# Patient Record
Sex: MALE | Race: BLACK OR AFRICAN AMERICAN | NOT HISPANIC OR LATINO | Employment: FULL TIME | URBAN - METROPOLITAN AREA
[De-identification: names, ages, dates, MRNs, and addresses within clinical notes are randomized per-mention and may not be internally consistent; named-entity substitution may affect disease eponyms.]

---

## 2020-11-18 ENCOUNTER — OFFICE VISIT (OUTPATIENT)
Dept: ENDOCRINOLOGY | Facility: CLINIC | Age: 60
End: 2020-11-18

## 2020-11-18 VITALS
DIASTOLIC BLOOD PRESSURE: 82 MMHG | HEIGHT: 66 IN | BODY MASS INDEX: 50.62 KG/M2 | SYSTOLIC BLOOD PRESSURE: 124 MMHG | RESPIRATION RATE: 16 BRPM | TEMPERATURE: 97.5 F | HEART RATE: 79 BPM | WEIGHT: 315 LBS | OXYGEN SATURATION: 98 %

## 2020-11-18 DIAGNOSIS — Z79.4 TYPE 2 DIABETES MELLITUS WITH HYPERGLYCEMIA, WITH LONG-TERM CURRENT USE OF INSULIN (HCC): Primary | ICD-10-CM

## 2020-11-18 DIAGNOSIS — I10 HYPERTENSION, UNSPECIFIED TYPE: ICD-10-CM

## 2020-11-18 DIAGNOSIS — E66.01 CLASS 3 SEVERE OBESITY DUE TO EXCESS CALORIES WITH SERIOUS COMORBIDITY AND BODY MASS INDEX (BMI) OF 60.0 TO 69.9 IN ADULT (HCC): ICD-10-CM

## 2020-11-18 DIAGNOSIS — E78.00 PURE HYPERCHOLESTEROLEMIA: ICD-10-CM

## 2020-11-18 DIAGNOSIS — E11.65 TYPE 2 DIABETES MELLITUS WITH HYPERGLYCEMIA, WITH LONG-TERM CURRENT USE OF INSULIN (HCC): Primary | ICD-10-CM

## 2020-11-18 PROBLEM — E66.813 CLASS 3 SEVERE OBESITY DUE TO EXCESS CALORIES WITH SERIOUS COMORBIDITY AND BODY MASS INDEX (BMI) OF 60.0 TO 69.9 IN ADULT: Status: ACTIVE | Noted: 2020-11-18

## 2020-11-18 PROBLEM — N18.9 CKD (CHRONIC KIDNEY DISEASE): Status: ACTIVE | Noted: 2020-11-18

## 2020-11-18 LAB — HBA1C MFR BLD: 8.4 %

## 2020-11-18 PROCEDURE — 83036 HEMOGLOBIN GLYCOSYLATED A1C: CPT | Performed by: PHYSICIAN ASSISTANT

## 2020-11-18 PROCEDURE — 99214 OFFICE O/P EST MOD 30 MIN: CPT | Performed by: PHYSICIAN ASSISTANT

## 2020-11-18 RX ORDER — INSULIN GLARGINE 100 [IU]/ML
55 INJECTION, SOLUTION SUBCUTANEOUS NIGHTLY
COMMUNITY
End: 2021-10-18 | Stop reason: SDUPTHER

## 2020-11-18 RX ORDER — PEN NEEDLE, DIABETIC 32GX 5/32"
NEEDLE, DISPOSABLE MISCELLANEOUS
COMMUNITY
Start: 2020-11-05 | End: 2021-10-18 | Stop reason: SDUPTHER

## 2020-11-18 RX ORDER — BLOOD SUGAR DIAGNOSTIC
STRIP MISCELLANEOUS
COMMUNITY
Start: 2020-09-25 | End: 2021-06-08 | Stop reason: SDUPTHER

## 2020-11-18 RX ORDER — METOLAZONE 2.5 MG/1
1 TABLET ORAL DAILY
COMMUNITY

## 2020-11-18 NOTE — PROGRESS NOTES
"     Office Note      Date: 2020  Patient Name: Joseph Moore  MRN: [id number]  : 1960    Chief Complaint   Patient presents with   • Diabetes       History of Present Illness:   Joseph Moore is a 59 y.o. male who presents today for type 2 diabetes.   He remains on basal/bolus insulin and Trulicity.  Tolerating well.  He is testing blood sugars 4 times per day.  He reports fasting readings are typically <140.  Readings later in the day are variable, but often mid 100s.  Rarely has readings over 200.  He denies any hypoglycemia.  He denies any problems with his feet.  He remains on statin.  Eye exam due.  Kidneys - followed by nephrology.  Lipids up-to-date.    Subjective      Review of Systems:   Review of Systems   Constitutional: Negative for appetite change, chills, fatigue, fever and unexpected weight change.   Respiratory: Negative for cough, shortness of breath and wheezing.    Cardiovascular: Negative for chest pain, palpitations and leg swelling.   Gastrointestinal: Negative for abdominal pain, constipation, diarrhea, nausea and vomiting.   Endocrine: Negative for cold intolerance, heat intolerance, polydipsia, polyphagia and polyuria.   Neurological: Negative for tremors, syncope, weakness, numbness and headaches.       The following portions of the patient's history were reviewed and updated as appropriate: allergies, current medications, past family history, past medical history, past social history, past surgical history and problem list.    Objective     Vitals:    20 0915   BP: 124/82   Pulse: 79   Resp: 16   Temp: 97.5 °F (36.4 °C)   TempSrc: Infrared   SpO2: 98%   Weight: (!) 177 kg (389 lb 6.4 oz)   Height: 166.4 cm (65.5\")     Body mass index is 63.81 kg/m².    Physical Exam  Vitals signs reviewed.   Constitutional:       General: He is not in acute distress.     Appearance: Normal appearance.   Cardiovascular:      Pulses:           Dorsalis pedis pulses are 2+ on the right " side and 2+ on the left side.        Posterior tibial pulses are 2+ on the right side and 2+ on the left side.   Feet:      Right foot:      Protective Sensation: 10 sites tested. 10 sites sensed.      Skin integrity: Dry skin present.      Left foot:      Protective Sensation: 10 sites tested. 10 sites sensed.      Skin integrity: Dry skin present.      Comments:   Pes planus  Neurological:      Mental Status: He is alert and oriented to person, place, and time.   Psychiatric:         Mood and Affect: Mood and affect normal.         HEMOGLOBIN A1C  Lab Results   Component Value Date    HGBA1C 8.4 11/18/2020         Current Outpatient Medications   Medication Instructions   • Accu-Chek Guide test strip No dose, route, or frequency recorded.   • allopurinol (ZYLOPRIM) 300 mg, Oral, Daily   • BD Pen Needle Mariam 2nd Gen 32G X 4 MM misc U UTD QID   • carvedilol (COREG) 12.5 mg, Oral, 2 Times Daily With Meals   • Dulaglutide 1.5 mg, Subcutaneous, Weekly   • felodipine (PLENDIL) 10 mg, Oral, Daily   • ferrous sulfate 325 mg, Oral, 2 Times Daily   • furosemide (LASIX) 40 mg, Oral, 2 Times Daily   • insulin aspart (novoLOG FLEXPEN) 100 UNIT/ML solution pen-injector sc pen 17 units with breakfast, 17 units lunch, 20 units supper   • Insulin Glargine (BASAGLAR KWIKPEN) 55 Units, Subcutaneous, Nightly   • lovastatin (MEVACOR) 80 mg, Oral, Nightly   • metOLazone (ZAROXOLYN) 2.5 MG tablet 1 tablet, Daily   • omeprazole (PRILOSEC) 20 mg, Oral, Daily       Assessment / Plan      Assessment & Plan:  1. Type 2 diabetes mellitus with hyperglycemia, with long-term current use of insulin (CMS/[facility])  A1c above goal.  Increase Basaglar to 55 units daily.  Encouraged to work on diet and increase physical activity.  Recommend that he start testing 2 hours after meals.  If readings over 180, will need to decrease carbohydrates/increase physical activity or we will need to increase mealtime insulin.  Advised to send in blood sugars if staying  above goals.  - POC Glycosylated Hemoglobin (Hb A1C)    2. Hypertension, unspecified type  BP okay.  Continue follow-up with nephrology.    3. Pure hypercholesterolemia  Continue statin.    4. Class 3 severe obesity due to excess calories with serious comorbidity and body mass index (BMI) of 60.0 to 69.9 in adult (CMS/[facility])  Encouraged to work on healthy dietary choices, portion control, and regular exercise.      Return in about 3 months (around 2/18/2021) for Next scheduled follow up.     JOHANNA Mcgarry  11/18/2020

## 2021-02-19 ENCOUNTER — OFFICE VISIT (OUTPATIENT)
Dept: ENDOCRINOLOGY | Facility: CLINIC | Age: 61
End: 2021-02-19

## 2021-02-19 VITALS
OXYGEN SATURATION: 98 % | TEMPERATURE: 97.5 F | DIASTOLIC BLOOD PRESSURE: 72 MMHG | BODY MASS INDEX: 50.62 KG/M2 | HEART RATE: 84 BPM | HEIGHT: 66 IN | SYSTOLIC BLOOD PRESSURE: 112 MMHG | WEIGHT: 315 LBS

## 2021-02-19 DIAGNOSIS — E11.65 TYPE 2 DIABETES MELLITUS WITH HYPERGLYCEMIA, WITH LONG-TERM CURRENT USE OF INSULIN (HCC): Primary | Chronic | ICD-10-CM

## 2021-02-19 DIAGNOSIS — Z79.4 TYPE 2 DIABETES MELLITUS WITH HYPERGLYCEMIA, WITH LONG-TERM CURRENT USE OF INSULIN (HCC): Primary | Chronic | ICD-10-CM

## 2021-02-19 DIAGNOSIS — E66.01 CLASS 3 SEVERE OBESITY DUE TO EXCESS CALORIES WITH SERIOUS COMORBIDITY AND BODY MASS INDEX (BMI) OF 60.0 TO 69.9 IN ADULT (HCC): Chronic | ICD-10-CM

## 2021-02-19 DIAGNOSIS — E11.3399 MODERATE NONPROLIFERATIVE DIABETIC RETINOPATHY ASSOCIATED WITH TYPE 2 DIABETES MELLITUS, MACULAR EDEMA PRESENCE UNSPECIFIED, UNSPECIFIED LATERALITY (HCC): ICD-10-CM

## 2021-02-19 LAB
EXPIRATION DATE: NORMAL
HBA1C MFR BLD: 6.4 %
Lab: NORMAL

## 2021-02-19 PROCEDURE — 99214 OFFICE O/P EST MOD 30 MIN: CPT | Performed by: PHYSICIAN ASSISTANT

## 2021-02-19 PROCEDURE — 83036 HEMOGLOBIN GLYCOSYLATED A1C: CPT | Performed by: PHYSICIAN ASSISTANT

## 2021-02-19 NOTE — PROGRESS NOTES
"     Office Note      Date: 2020  Patient Name: Joseph Moore  MRN: [id number]  : 1960    Chief Complaint   Patient presents with   • Diabetes       History of Present Illness:   Joseph Moore is a 60 y.o. male who presents today for type 2 diabetes.  He remains on basal/bolus insulin and Trulicity.  He reports tolerating medications well.  He is testing blood sugars 4 times per day.  He reports that readings have been better.  He denies any hypoglycemia.  He reports that he has made some improvements in his diet.  He denies any problems with his feet.  Foot exam up to date.  He remains on statin.  Eye exam due - scheduled for April.  Kidneys - followed by nephrology - Dr. Durand.    Subjective       The following portions of the patient's history were reviewed and updated as appropriate: allergies, current medications, past family history, past medical history, past social history, past surgical history and problem list.    Objective     Vitals:    21 1418   BP: 112/72   Pulse: 84   Temp: 97.5 °F (36.4 °C)   TempSrc: Infrared   SpO2: 98%   Weight: (!) 177 kg (389 lb 6.4 oz)   Height: 166.4 cm (65.5\")   PainSc: 0-No pain     Body mass index is 63.81 kg/m².    Physical Exam  Vitals signs reviewed.   Constitutional:       General: He is not in acute distress.  Neurological:      Mental Status: He is alert and oriented to person, place, and time.   Psychiatric:         Mood and Affect: Mood and affect normal.         HEMOGLOBIN A1C  Lab Results   Component Value Date    HGBA1C 6.4 2021         Current Outpatient Medications   Medication Instructions   • Accu-Chek Guide test strip No dose, route, or frequency recorded.   • allopurinol (ZYLOPRIM) 300 mg, Oral, Daily   • BD Pen Needle Mariam 2nd Gen 32G X 4 MM misc U UTD QID   • carvedilol (COREG) 12.5 mg, Oral, 2 Times Daily With Meals   • Dulaglutide 1.5 mg, Subcutaneous, Weekly   • felodipine (PLENDIL) 10 mg, Oral, Daily   • ferrous sulfate " 325 mg, Oral, 2 Times Daily   • furosemide (LASIX) 40 mg, Oral, 2 Times Daily   • insulin aspart (novoLOG FLEXPEN) 100 UNIT/ML solution pen-injector sc pen 17 units with breakfast, 17 units lunch, 20 units supper   • Insulin Glargine (BASAGLAR KWIKPEN) 55 Units, Subcutaneous, Nightly   • lovastatin (MEVACOR) 80 mg, Oral, Nightly   • metOLazone (ZAROXOLYN) 2.5 MG tablet 1 tablet, Daily   • omeprazole (PRILOSEC) 20 mg, Oral, Daily       Assessment / Plan      Assessment & Plan:  1. Type 2 diabetes mellitus with hyperglycemia, with long-term current use of insulin (CMS/[facility])  A1c okay.  Reviewed BG readings.  No changed to medications at this time.  Encouraged healthy dietary choices, regular exercise, weight loss.  Refer for DM/nutrition education.  Continue Basaglar, Novolog, and Trulicity.  - POC Glycosylated Hemoglobin (Hb A1C)    2. Moderate nonproliferative diabetic retinopathy associated with type 2 diabetes mellitus, macular edema presence unspecified, unspecified laterality (CMS/[facility])  Continue follow up with ophthalmology.    3. Class 3 severe obesity due to excess calories with serious comorbidity and body mass index (BMI) of 60.0 to 69.9 in adult (CMS/[facility])  Encouraged to work on healthy dietary choices, portion control, and regular exercise.      Return in about 3 months (around 5/19/2021) for Next scheduled follow up.     JOHANNA Mcgarry  02/19/2021

## 2021-03-16 ENCOUNTER — TELEPHONE (OUTPATIENT)
Dept: ENDOCRINOLOGY | Facility: CLINIC | Age: 61
End: 2021-03-16

## 2021-03-16 NOTE — TELEPHONE ENCOUNTER
Approvedon March 15  Your PA request has been approved. Additional information will be provided in the approval communication. (Message 1145)  Drug  Trulicity 1.5MG/0.5ML pen-injectors  Form  [facility] Electronic PA Form (NCPDP)

## 2021-05-19 ENCOUNTER — OFFICE VISIT (OUTPATIENT)
Dept: ENDOCRINOLOGY | Facility: CLINIC | Age: 61
End: 2021-05-19

## 2021-05-19 VITALS
DIASTOLIC BLOOD PRESSURE: 58 MMHG | WEIGHT: 315 LBS | TEMPERATURE: 97.1 F | HEART RATE: 80 BPM | SYSTOLIC BLOOD PRESSURE: 120 MMHG | HEIGHT: 65 IN | BODY MASS INDEX: 52.48 KG/M2 | OXYGEN SATURATION: 96 %

## 2021-05-19 DIAGNOSIS — Z79.4 TYPE 2 DIABETES MELLITUS WITH HYPERGLYCEMIA, WITH LONG-TERM CURRENT USE OF INSULIN (HCC): Primary | Chronic | ICD-10-CM

## 2021-05-19 DIAGNOSIS — E66.01 CLASS 3 SEVERE OBESITY DUE TO EXCESS CALORIES WITH SERIOUS COMORBIDITY AND BODY MASS INDEX (BMI) OF 60.0 TO 69.9 IN ADULT (HCC): ICD-10-CM

## 2021-05-19 DIAGNOSIS — E11.3399 MODERATE NONPROLIFERATIVE DIABETIC RETINOPATHY ASSOCIATED WITH TYPE 2 DIABETES MELLITUS, MACULAR EDEMA PRESENCE UNSPECIFIED, UNSPECIFIED LATERALITY (HCC): ICD-10-CM

## 2021-05-19 DIAGNOSIS — E11.65 TYPE 2 DIABETES MELLITUS WITH HYPERGLYCEMIA, WITH LONG-TERM CURRENT USE OF INSULIN (HCC): Primary | Chronic | ICD-10-CM

## 2021-05-19 DIAGNOSIS — E11.22 CHRONIC KIDNEY DISEASE DUE TO TYPE 2 DIABETES MELLITUS (HCC): ICD-10-CM

## 2021-05-19 LAB
EXPIRATION DATE: NORMAL
HBA1C MFR BLD: 8.2 %
Lab: NORMAL

## 2021-05-19 PROCEDURE — 99214 OFFICE O/P EST MOD 30 MIN: CPT | Performed by: PHYSICIAN ASSISTANT

## 2021-05-19 PROCEDURE — 83036 HEMOGLOBIN GLYCOSYLATED A1C: CPT | Performed by: PHYSICIAN ASSISTANT

## 2021-05-19 NOTE — PROGRESS NOTES
"     Office Note      Date: 2021  Patient Name: Joseph Moore  MRN: [id number]  : 1960    Chief Complaint   Patient presents with   • Diabetes       History of Present Illness:   Joseph Moore is a 60 y.o. male who presents today for type 2 diabetes.  He remains on basal/bolus insulin and Trulicity.  Tolerating occasions well.  He is testing FSBS 4 times per day.  He denies any hypoglycemia.  He forgot to bring glucometer today.  He reports readings have been 130-190 range.  He has not scheduled for diabetes education/nutrition yet.  He denies any problems with his feet - foot exam up to date.  Eye exam up to date - stable 2021.  He continues to be followed by nephrology, Dr. Durand.    Subjective      Review of Systems:   Review of Systems   Constitutional: Negative.    Cardiovascular: Negative.    Gastrointestinal: Negative.    Endocrine: Negative.        The following portions of the patient's history were reviewed and updated as appropriate: allergies, current medications, past family history, past medical history, past social history, past surgical history and problem list.    Objective     /58 (BP Location: Right arm, Patient Position: Sitting, Cuff Size: Adult)   Pulse 80   Temp 97.1 °F (36.2 °C) (Infrared)   Ht 165.1 cm (65\")   Wt (!) 176 kg (388 lb)   SpO2 96%   BMI 64.57 kg/m²       Physical Exam  Vitals reviewed.   Constitutional:       General: He is not in acute distress.  Neurological:      Mental Status: He is alert and oriented to person, place, and time.   Psychiatric:         Mood and Affect: Affect normal.         HEMOGLOBIN A1C  Lab Results   Component Value Date    HGBA1C 8.2 2021    HGBA1C 6.4 2021    HGBA1C 8.4 2020       Current Outpatient Medications   Medication Instructions   • Accu-Chek Guide test strip Testing 4 times per day; E11.65   • allopurinol (ZYLOPRIM) 300 mg, Oral, Daily   • BASAGLAR KWIKPEN 55 Units, Subcutaneous, Nightly "   • BD Pen Needle Mariam 2nd Gen 32G X 4 MM misc U UTD QID   • carvedilol (COREG) 12.5 mg, Oral, 2 Times Daily With Meals   • Dulaglutide 1.5 mg, Subcutaneous, Weekly   • felodipine (PLENDIL) 10 mg, Oral, Daily   • ferrous sulfate 325 mg, Oral, 2 Times Daily   • furosemide (LASIX) 40 mg, Oral, 2 Times Daily   • insulin aspart (novoLOG FLEXPEN) 100 UNIT/ML solution pen-injector sc pen 17 units with breakfast, 17 units lunch, 20 units supper   • lovastatin (MEVACOR) 80 mg, Oral, Nightly   • metOLazone (ZAROXOLYN) 2.5 MG tablet 1 tablet, Daily   • omeprazole (PRILOSEC) 20 mg, Oral, Daily       Assessment / Plan      Assessment & Plan:  1. Type 2 diabetes mellitus with hyperglycemia, with long-term current use of insulin (CMS/[facility])  A1c back up to 8.2% from 6.4% last visit 3 months ago.  Encouraged to keep working on nutritious dietary choices, portion control, regular exercise, weight loss.  He will send in blood sugar readings in 2 weeks.  He will schedule for DM/nutrition education.  BP well controlled.  - POC Glycosylated Hemoglobin (Hb A1C)    2. Moderate nonproliferative diabetic retinopathy associated with type 2 diabetes mellitus, macular edema presence unspecified, unspecified laterality (CMS/HCC)  Encouraged better blood sugar control.  Continue follow up with ophthalmology.    3.  Chronic kidney disease due to type 2 diabetes mellitus (CMS/HCC)  Encouraged good blood sugar control.  Continue follow up with nephrology.    4. Class 3 severe obesity due to excess calories with serious comorbidity and body mass index (BMI) of 60.0 to 69.9 in adult   Encouraged nutritious dietary choices, portion control, regular exercise.      Return in about 3 months (around 8/19/2021) for Next scheduled follow up.     JOHANNA Mcgarry  Endocrinology  05/19/2021

## 2021-06-08 RX ORDER — BLOOD SUGAR DIAGNOSTIC
STRIP MISCELLANEOUS
Qty: 400 EACH | Refills: 3 | Status: SHIPPED | OUTPATIENT
Start: 2021-06-08

## 2021-10-18 RX ORDER — PEN NEEDLE, DIABETIC 32GX 5/32"
NEEDLE, DISPOSABLE MISCELLANEOUS
Qty: 400 EACH | Refills: 3 | Status: SHIPPED | OUTPATIENT
Start: 2021-10-18 | End: 2021-10-22 | Stop reason: SDUPTHER

## 2021-10-18 RX ORDER — INSULIN GLARGINE 100 [IU]/ML
55 INJECTION, SOLUTION SUBCUTANEOUS NIGHTLY
Qty: 45 ML | Refills: 1 | Status: SHIPPED | OUTPATIENT
Start: 2021-10-18 | End: 2021-10-22 | Stop reason: SDUPTHER

## 2021-10-18 NOTE — TELEPHONE ENCOUNTER
PT CALLED REQUESTING A REFILL OF NOVOLOG, HIS OTHER INSULINE (DOESN'T KNOW NAME), AND PEN NEEDLES IN TO MEET ON [place] [facility]. PLEASE AND THANK YOU

## 2021-10-22 RX ORDER — PEN NEEDLE, DIABETIC 32GX 5/32"
NEEDLE, DISPOSABLE MISCELLANEOUS
Qty: 400 EACH | Refills: 3 | Status: SHIPPED | OUTPATIENT
Start: 2021-10-22 | End: 2023-01-09

## 2021-10-22 RX ORDER — INSULIN GLARGINE 100 [IU]/ML
55 INJECTION, SOLUTION SUBCUTANEOUS NIGHTLY
Qty: 45 ML | Refills: 1 | Status: SHIPPED | OUTPATIENT
Start: 2021-10-22 | End: 2022-04-28

## 2021-10-22 NOTE — TELEPHONE ENCOUNTER
PT CALLED REQUESTING REFILLS OF PEN NEEDLES, NOVOLOG PENS, AND BASAGLAR TO BE SENT IN TO [place], [place]. PLEASE AND THANK YOU

## 2021-11-23 ENCOUNTER — OFFICE VISIT (OUTPATIENT)
Dept: ENDOCRINOLOGY | Facility: CLINIC | Age: 61
End: 2021-11-23

## 2021-11-23 VITALS
OXYGEN SATURATION: 99 % | BODY MASS INDEX: 52.48 KG/M2 | SYSTOLIC BLOOD PRESSURE: 114 MMHG | HEIGHT: 65 IN | DIASTOLIC BLOOD PRESSURE: 68 MMHG | HEART RATE: 71 BPM | WEIGHT: 315 LBS

## 2021-11-23 DIAGNOSIS — E66.01 CLASS 3 SEVERE OBESITY DUE TO EXCESS CALORIES WITH SERIOUS COMORBIDITY AND BODY MASS INDEX (BMI) OF 60.0 TO 69.9 IN ADULT (HCC): Chronic | ICD-10-CM

## 2021-11-23 DIAGNOSIS — E11.3393 MODERATE NONPROLIFERATIVE DIABETIC RETINOPATHY OF BOTH EYES ASSOCIATED WITH TYPE 2 DIABETES MELLITUS, MACULAR EDEMA PRESENCE UNSPECIFIED (HCC): ICD-10-CM

## 2021-11-23 DIAGNOSIS — Z79.4 TYPE 2 DIABETES MELLITUS WITH HYPERGLYCEMIA, WITH LONG-TERM CURRENT USE OF INSULIN (HCC): Primary | ICD-10-CM

## 2021-11-23 DIAGNOSIS — E11.22 CHRONIC KIDNEY DISEASE DUE TO TYPE 2 DIABETES MELLITUS (HCC): ICD-10-CM

## 2021-11-23 DIAGNOSIS — E11.65 TYPE 2 DIABETES MELLITUS WITH HYPERGLYCEMIA, WITH LONG-TERM CURRENT USE OF INSULIN (HCC): Primary | ICD-10-CM

## 2021-11-23 LAB
EXPIRATION DATE: ABNORMAL
EXPIRATION DATE: NORMAL
GLUCOSE BLDC GLUCOMTR-MCNC: 184 MG/DL (ref 70–130)
HBA1C MFR BLD: 8.8 %
Lab: ABNORMAL
Lab: NORMAL

## 2021-11-23 PROCEDURE — 99214 OFFICE O/P EST MOD 30 MIN: CPT | Performed by: PHYSICIAN ASSISTANT

## 2021-11-23 PROCEDURE — 82947 ASSAY GLUCOSE BLOOD QUANT: CPT | Performed by: PHYSICIAN ASSISTANT

## 2021-11-23 PROCEDURE — 83036 HEMOGLOBIN GLYCOSYLATED A1C: CPT | Performed by: PHYSICIAN ASSISTANT

## 2022-02-23 ENCOUNTER — TELEPHONE (OUTPATIENT)
Dept: ENDOCRINOLOGY | Facility: CLINIC | Age: 62
End: 2022-02-23

## 2022-03-14 ENCOUNTER — OFFICE VISIT (OUTPATIENT)
Dept: ENDOCRINOLOGY | Facility: CLINIC | Age: 62
End: 2022-03-14

## 2022-03-14 VITALS
OXYGEN SATURATION: 98 % | HEIGHT: 65 IN | HEART RATE: 76 BPM | WEIGHT: 315 LBS | DIASTOLIC BLOOD PRESSURE: 78 MMHG | SYSTOLIC BLOOD PRESSURE: 134 MMHG | BODY MASS INDEX: 52.48 KG/M2

## 2022-03-14 DIAGNOSIS — E11.65 TYPE 2 DIABETES MELLITUS WITH HYPERGLYCEMIA, WITH LONG-TERM CURRENT USE OF INSULIN: Primary | Chronic | ICD-10-CM

## 2022-03-14 DIAGNOSIS — E66.01 CLASS 3 SEVERE OBESITY DUE TO EXCESS CALORIES WITH SERIOUS COMORBIDITY AND BODY MASS INDEX (BMI) OF 60.0 TO 69.9 IN ADULT: Chronic | ICD-10-CM

## 2022-03-14 DIAGNOSIS — Z79.4 TYPE 2 DIABETES MELLITUS WITH HYPERGLYCEMIA, WITH LONG-TERM CURRENT USE OF INSULIN: Primary | Chronic | ICD-10-CM

## 2022-03-14 LAB
EXPIRATION DATE: ABNORMAL
EXPIRATION DATE: NORMAL
GLUCOSE BLDC GLUCOMTR-MCNC: 152 MG/DL (ref 70–130)
HBA1C MFR BLD: 8.9 %
Lab: ABNORMAL
Lab: NORMAL

## 2022-03-14 PROCEDURE — 99214 OFFICE O/P EST MOD 30 MIN: CPT | Performed by: PHYSICIAN ASSISTANT

## 2022-03-14 PROCEDURE — 82947 ASSAY GLUCOSE BLOOD QUANT: CPT | Performed by: PHYSICIAN ASSISTANT

## 2022-03-14 PROCEDURE — 83036 HEMOGLOBIN GLYCOSYLATED A1C: CPT | Performed by: PHYSICIAN ASSISTANT

## 2022-03-14 RX ORDER — PROCHLORPERAZINE 25 MG/1
1 SUPPOSITORY RECTAL
Qty: 1 EACH | Refills: 3 | Status: SHIPPED | OUTPATIENT
Start: 2022-03-14 | End: 2022-10-21 | Stop reason: SDUPTHER

## 2022-03-14 RX ORDER — PROCHLORPERAZINE 25 MG/1
1 SUPPOSITORY RECTAL CONTINUOUS
Qty: 1 EACH | Refills: 0 | Status: SHIPPED | OUTPATIENT
Start: 2022-03-14

## 2022-03-14 RX ORDER — ERGOCALCIFEROL 1.25 MG/1
50000 CAPSULE ORAL WEEKLY
COMMUNITY
Start: 2022-01-23

## 2022-03-14 RX ORDER — SEMAGLUTIDE 1.34 MG/ML
1 INJECTION, SOLUTION SUBCUTANEOUS WEEKLY
Qty: 9 ML | Refills: 1 | Status: SHIPPED | OUTPATIENT
Start: 2022-03-14 | End: 2022-06-24

## 2022-03-14 RX ORDER — PROCHLORPERAZINE 25 MG/1
SUPPOSITORY RECTAL
Qty: 9 EACH | Refills: 3 | Status: SHIPPED | OUTPATIENT
Start: 2022-03-14

## 2022-03-15 ENCOUNTER — PRIOR AUTHORIZATION (OUTPATIENT)
Dept: ENDOCRINOLOGY | Facility: CLINIC | Age: 62
End: 2022-03-15

## 2022-03-15 NOTE — TELEPHONE ENCOUNTER
ADALID LEONG ([place]: [id number])  Ozempic (1 MG/DOSE) 4MG/3ML pen-injectors     Form  [facility] Electronic PA Form (2017 NCPDP)  Created  1 hour ago  Sent to Plan  1 hour ago  Plan Response  1 hour ago  Submit Clinical Questions  1 hour ago  Determination  Favorable  1 hour ago  Message from Plan  Your PA request has been approved. Additional information will be provided in the approval communication. (Message 1143)

## 2022-04-18 RX ORDER — INSULIN ASPART 100 [IU]/ML
INJECTION, SOLUTION INTRAVENOUS; SUBCUTANEOUS
Qty: 60 ML | Refills: 3 | Status: SHIPPED | OUTPATIENT
Start: 2022-04-18

## 2022-04-28 RX ORDER — INSULIN GLARGINE 100 [IU]/ML
INJECTION, SOLUTION SUBCUTANEOUS
Qty: 51 ML | Refills: 1 | Status: SHIPPED | OUTPATIENT
Start: 2022-04-28 | End: 2022-10-07

## 2022-05-18 ENCOUNTER — TRANSCRIBE ORDERS (OUTPATIENT)
Dept: ADMINISTRATIVE | Facility: HOSPITAL | Age: 62
End: 2022-05-18

## 2022-05-18 DIAGNOSIS — R11.2 NAUSEA AND VOMITING, UNSPECIFIED VOMITING TYPE: Primary | ICD-10-CM

## 2022-05-31 ENCOUNTER — DOCUMENTATION (OUTPATIENT)
Dept: ENDOCRINOLOGY | Facility: CLINIC | Age: 62
End: 2022-05-31

## 2022-05-31 NOTE — PROGRESS NOTES
Patient is eligible to fill with [facility].    KE[facility]  Ozempic-$25/ 30 days    April Letitia Solano  Pharmacy Care Coordinator  5/31/2022  15:12 EDT

## 2022-06-20 ENCOUNTER — HOSPITAL ENCOUNTER (OUTPATIENT)
Dept: NUCLEAR MEDICINE | Facility: HOSPITAL | Age: 62
Discharge: HOME | End: 2022-06-20

## 2022-06-20 DIAGNOSIS — R11.2 NAUSEA AND VOMITING, UNSPECIFIED VOMITING TYPE: ICD-10-CM

## 2022-06-20 PROCEDURE — A9541 TC99M SULFUR COLLOID: HCPCS | Performed by: INTERNAL MEDICINE

## 2022-06-20 PROCEDURE — 78264 GASTRIC EMPTYING IMG STUDY: CPT

## 2022-06-20 PROCEDURE — 0 TECHNETIUM SULFUR COLLOID: Performed by: INTERNAL MEDICINE

## 2022-06-20 RX ADMIN — TECHNETIUM TC 99M SULFUR COLLOID 1 DOSE: KIT at 08:40

## 2022-06-24 ENCOUNTER — OFFICE VISIT (OUTPATIENT)
Dept: ENDOCRINOLOGY | Facility: CLINIC | Age: 62
End: 2022-06-24

## 2022-06-24 VITALS
SYSTOLIC BLOOD PRESSURE: 114 MMHG | OXYGEN SATURATION: 99 % | DIASTOLIC BLOOD PRESSURE: 75 MMHG | BODY MASS INDEX: 52.48 KG/M2 | WEIGHT: 315 LBS | HEART RATE: 72 BPM | HEIGHT: 65 IN

## 2022-06-24 DIAGNOSIS — E11.65 TYPE 2 DIABETES MELLITUS WITH HYPERGLYCEMIA, WITH LONG-TERM CURRENT USE OF INSULIN: Primary | Chronic | ICD-10-CM

## 2022-06-24 DIAGNOSIS — Z79.4 TYPE 2 DIABETES MELLITUS WITH HYPERGLYCEMIA, WITH LONG-TERM CURRENT USE OF INSULIN: Primary | Chronic | ICD-10-CM

## 2022-06-24 LAB
EXPIRATION DATE: NORMAL
EXPIRATION DATE: NORMAL
GLUCOSE BLDC GLUCOMTR-MCNC: 109 MG/DL (ref 70–130)
HBA1C MFR BLD: 7.3 %
Lab: NORMAL
Lab: NORMAL

## 2022-06-24 PROCEDURE — 83036 HEMOGLOBIN GLYCOSYLATED A1C: CPT | Performed by: PHYSICIAN ASSISTANT

## 2022-06-24 PROCEDURE — 95251 CONT GLUC MNTR ANALYSIS I&R: CPT | Performed by: PHYSICIAN ASSISTANT

## 2022-06-24 PROCEDURE — 99213 OFFICE O/P EST LOW 20 MIN: CPT | Performed by: PHYSICIAN ASSISTANT

## 2022-06-24 NOTE — PATIENT INSTRUCTIONS
Stop Ozempic.  Continue Basaglar 55 units daily.  Novolog 17 units with breakfast, 17 units with lunch, 20 units with supper plus correction for high blood sugar as listed below.           Correction insulin (add to mealtime insulin):   0 units Less than 150   2 units 150 - 199   4 units 200 - 249   6 units 250 - 299   8 units 300+

## 2022-06-24 NOTE — PROGRESS NOTES
Office Note      Date: 2022  Patient Name: Joseph Moore  MRN: [id number]  : 1960    Chief Complaint   Patient presents with   • Diabetes       History of Present Illness:   Joseph Moore is a 61 y.o. male who presents today for follow up on type 2 diabetes.  Diabetes was diagnosed in .  Known diabetic complications: nephropathy and retinopathy.  He remains on basal/bolus insulin and Ozempic.  He started using Dexcom G6 CGM after last visit.  He reports that blood sugars are variable, but good some days.  He reports rare hypoglycemia.  Current diet: He reports trying to eat more fruit.  Current exercise: None.  Feet: No sores. Foot exam up to date.  Eye exam current (within one year): yes, 2022, Dr. Joseph Marquez. Moderate non-proliferative retinopathy without macular edema - stable.  ACE inhibitor/ARB: No.  Statin: Yes, lovastatin.  CKD - followed by nephrology, Dr. Durand.  He reports intermittent diarrhea.  He reports nausea and vomiting in the morning 1-2 times per week. He had gastric emptying study on 2022.  This revealed delayed gastric emptying with no emptying of the stomach at 90 minutes and stasis in the esophagus.      Subjective      Review of Systems:   Review of Systems   Constitutional: Negative.    Cardiovascular: Negative.    Gastrointestinal: Positive for diarrhea, nausea and vomiting.   Endocrine: Negative.        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   • Gallstones    • GERD (gastroesophageal reflux disease)    • Gout    • Hypercholesterolemia    • Hyperlipidemia    • Hypertension    • Long term (current) use of insulin (HCC)    • Obesity     body mass index 30+-obesity   • Renal disorder    • Sleep apnea with use of  "continuous positive airway pressure (CPAP)     instructed to bring mask and tubing   • Wears glasses     reading      Past Surgical History:   Procedure Laterality Date   • CHOLECYSTECTOMY N/A 10/25/2016    Procedure: CHOLECYSTECTOMY LAPAROSCOPIC, POSSIBLE OPEN;  Surgeon: Yasemin Dobbs MD;  Location: [facility];  Service:    • CATARACT EXTRACTION      bilateral cataracts removed   • COLONOSCOPY      2015       The following portions of the patient's history were reviewed and updated as appropriate: allergies, current medications, past family history, past medical history, past social history, past surgical history and problem list.    Objective     Vitals:    06/24/22 0831 06/24/22 0859   BP: 114/75    Pulse: 72    SpO2: 99%    Weight: (!) 156 kg (345 lb) (!) 156 kg (345 lb)   Height: 157.5 cm (62\") 165.1 cm (65\")   Corrected height to 65\".    Body mass index is 57.41 kg/m².    Physical Exam  Vitals reviewed.   Constitutional:       General: He is not in acute distress.  Neurological:      Mental Status: He is alert and oriented to person, place, and time.   Psychiatric:         Mood and Affect: Affect normal.         HEMOGLOBIN A1C  Lab Results   Component Value Date    HGBA1C 7.3 06/24/2022    HGBA1C 8.9 03/14/2022    HGBA1C 8.8 11/23/2021     GLUCOSE  Lab Results   Component Value Date    POCGLU 109 06/24/2022       Current Outpatient Medications   Medication Instructions   • Accu-Chek Guide test strip Testing 4 times per day; E11.65   • allopurinol (ZYLOPRIM) 300 mg, Oral, Daily   • BD Pen Needle Mariam 2nd Gen 32G X 4 MM misc Use 4 times daily for insulin injections   • carvedilol (COREG) 12.5 mg, Oral, 2 Times Daily With Meals   • Continuous Blood Gluc  (Dexcom G6 ) device 1 Device, Does not apply, Continuous, Use as directed for continuously monitoring glucose.   • Continuous Blood Gluc Sensor (Dexcom G6 Sensor) Does not apply, Every 10 Days   • Continuous Blood Gluc Transmit (Dexcom G6 " Transmitter) misc 1 Device, Does not apply, Every 3 Months   • felodipine (PLENDIL) 10 mg, Oral, Daily   • ferrous sulfate 325 mg, Oral, 2 Times Daily   • furosemide (LASIX) 40 mg, Oral, 2 Times Daily   • insulin aspart (NovoLOG FlexPen) 100 UNIT/ML solution pen-injector sc pen INJECT 17 UNITS WITH BREAKFAST AND LUNCH, 20 UNITS WITH SUPPER; PLUS CORRECTION FOR HYPERGLYCEMIA, MAX 70 UNITS DAILY   • Insulin Glargine (BASAGLAR KWIKPEN) 100 UNIT/ML injection pen INJECT 55 UNITS UNDER SKIN INTO APPROPRIATE AREA AS DIRECTED EVERY NIGHT   • lovastatin (MEVACOR) 80 mg, Oral, Nightly   • metOLazone (ZAROXOLYN) 2.5 MG tablet 1 tablet, Daily   • omeprazole (PRILOSEC) 20 mg, Oral, Daily   • vitamin D (ERGOCALCIFEROL) 50,000 Units, Oral, Weekly       Assessment / Plan      Assessment & Plan:  Diagnoses and all orders for this visit:    1. Type 2 diabetes mellitus with hyperglycemia, with long-term current use of insulin (HCC) (Primary)  A1c significantly improved and weight is down 30 pounds in the past 3 months.  Unfortunately, this is associated with nausea and vomiting weekly.  Reviewed CGM data and discussed with patient.  Sensor glucose average 163 for the past 2 weeks, 67% time in range , <1% <70, 0% very low <54, 5% >250.  Recommend to stop Ozempic due to significantly delayed gastric emptying with nausea and vomiting.  Continue basal/bolus insulin and CGM.  Encouraged nutritious dietary choices and regular exercise.  Continue follow up with PCP and specialists.  -     POC Glucose, Blood  -     POC Glycosylated Hemoglobin (Hb A1C)      Return in about 3 months (around 9/24/2022) for next scheduled follow up. He was advised to contact the office with any interval questions or concerns.    JOHANNA Mcgarry  Endocrinology  06/24/2022

## 2022-10-07 ENCOUNTER — OFFICE VISIT (OUTPATIENT)
Dept: ENDOCRINOLOGY | Facility: CLINIC | Age: 62
End: 2022-10-07

## 2022-10-07 VITALS
WEIGHT: 315 LBS | DIASTOLIC BLOOD PRESSURE: 82 MMHG | HEIGHT: 65 IN | HEART RATE: 81 BPM | BODY MASS INDEX: 52.48 KG/M2 | OXYGEN SATURATION: 95 % | SYSTOLIC BLOOD PRESSURE: 128 MMHG

## 2022-10-07 DIAGNOSIS — E11.65 TYPE 2 DIABETES MELLITUS WITH HYPERGLYCEMIA, WITH LONG-TERM CURRENT USE OF INSULIN: Primary | Chronic | ICD-10-CM

## 2022-10-07 DIAGNOSIS — Z79.4 TYPE 2 DIABETES MELLITUS WITH HYPERGLYCEMIA, WITH LONG-TERM CURRENT USE OF INSULIN: Primary | Chronic | ICD-10-CM

## 2022-10-07 DIAGNOSIS — E66.01 CLASS 3 SEVERE OBESITY DUE TO EXCESS CALORIES WITH SERIOUS COMORBIDITY AND BODY MASS INDEX (BMI) OF 60.0 TO 69.9 IN ADULT: Chronic | ICD-10-CM

## 2022-10-07 LAB
EXPIRATION DATE: ABNORMAL
EXPIRATION DATE: NORMAL
GLUCOSE BLDC GLUCOMTR-MCNC: 161 MG/DL (ref 70–130)
HBA1C MFR BLD: 8.1 %
Lab: ABNORMAL
Lab: NORMAL

## 2022-10-07 PROCEDURE — 99214 OFFICE O/P EST MOD 30 MIN: CPT | Performed by: PHYSICIAN ASSISTANT

## 2022-10-07 PROCEDURE — 83036 HEMOGLOBIN GLYCOSYLATED A1C: CPT | Performed by: PHYSICIAN ASSISTANT

## 2022-10-07 PROCEDURE — 95251 CONT GLUC MNTR ANALYSIS I&R: CPT | Performed by: PHYSICIAN ASSISTANT

## 2022-10-07 RX ORDER — INSULIN GLARGINE 100 [IU]/ML
60 INJECTION, SOLUTION SUBCUTANEOUS NIGHTLY
Qty: 60 ML | Refills: 3 | Status: SHIPPED | OUTPATIENT
Start: 2022-10-07

## 2022-10-21 DIAGNOSIS — Z79.4 TYPE 2 DIABETES MELLITUS WITH HYPERGLYCEMIA, WITH LONG-TERM CURRENT USE OF INSULIN: Chronic | ICD-10-CM

## 2022-10-21 DIAGNOSIS — E11.65 TYPE 2 DIABETES MELLITUS WITH HYPERGLYCEMIA, WITH LONG-TERM CURRENT USE OF INSULIN: Chronic | ICD-10-CM

## 2022-10-21 RX ORDER — PROCHLORPERAZINE 25 MG/1
1 SUPPOSITORY RECTAL
Qty: 1 EACH | Refills: 3 | Status: SHIPPED | OUTPATIENT
Start: 2022-10-21

## 2022-11-09 ENCOUNTER — PRIOR AUTHORIZATION (OUTPATIENT)
Dept: ENDOCRINOLOGY | Facility: CLINIC | Age: 62
End: 2022-11-09

## 2022-11-09 NOTE — TELEPHONE ENCOUNTER
ADALID LEONG Key: [id number] - PA Case ID: [id number] - Rx #: [id number] help? Call us at [phone]  Outcome  Approvedon November 8  Your PA request has been approved. Additional information will be provided in the approval communication. (Message 1145)  Drug  Dexcom G6 Transmitter  Form  [facility] Electronic PA Form (2017 NCPDP)

## 2023-01-09 RX ORDER — PEN NEEDLE, DIABETIC 32GX 5/32"
NEEDLE, DISPOSABLE MISCELLANEOUS
Qty: 400 EACH | Refills: 3 | Status: SHIPPED | OUTPATIENT
Start: 2023-01-09

## 2023-02-03 ENCOUNTER — OFFICE VISIT (OUTPATIENT)
Dept: ENDOCRINOLOGY | Facility: CLINIC | Age: 63
End: 2023-02-03
Payer: COMMERCIAL

## 2023-02-03 VITALS
BODY MASS INDEX: 52.48 KG/M2 | SYSTOLIC BLOOD PRESSURE: 128 MMHG | HEART RATE: 69 BPM | DIASTOLIC BLOOD PRESSURE: 80 MMHG | WEIGHT: 315 LBS | OXYGEN SATURATION: 98 % | HEIGHT: 65 IN

## 2023-02-03 DIAGNOSIS — E11.22 CHRONIC KIDNEY DISEASE DUE TO TYPE 2 DIABETES MELLITUS: ICD-10-CM

## 2023-02-03 DIAGNOSIS — E11.65 TYPE 2 DIABETES MELLITUS WITH HYPERGLYCEMIA, WITH LONG-TERM CURRENT USE OF INSULIN: Primary | Chronic | ICD-10-CM

## 2023-02-03 DIAGNOSIS — E66.01 CLASS 3 SEVERE OBESITY DUE TO EXCESS CALORIES WITH SERIOUS COMORBIDITY AND BODY MASS INDEX (BMI) OF 60.0 TO 69.9 IN ADULT: Chronic | ICD-10-CM

## 2023-02-03 DIAGNOSIS — Z79.4 TYPE 2 DIABETES MELLITUS WITH HYPERGLYCEMIA, WITH LONG-TERM CURRENT USE OF INSULIN: Primary | Chronic | ICD-10-CM

## 2023-02-03 LAB
EXPIRATION DATE: ABNORMAL
EXPIRATION DATE: NORMAL
GLUCOSE BLDC GLUCOMTR-MCNC: 164 MG/DL (ref 70–130)
HBA1C MFR BLD: 9.5 %
Lab: ABNORMAL
Lab: NORMAL

## 2023-02-03 PROCEDURE — 83036 HEMOGLOBIN GLYCOSYLATED A1C: CPT | Performed by: PHYSICIAN ASSISTANT

## 2023-02-03 PROCEDURE — 95251 CONT GLUC MNTR ANALYSIS I&R: CPT | Performed by: PHYSICIAN ASSISTANT

## 2023-02-03 PROCEDURE — 99214 OFFICE O/P EST MOD 30 MIN: CPT | Performed by: PHYSICIAN ASSISTANT

## 2023-02-03 RX ORDER — AMLODIPINE BESYLATE 10 MG/1
1 TABLET ORAL DAILY
COMMUNITY
Start: 2022-11-13

## 2023-02-03 NOTE — PROGRESS NOTES
Office Note      Date: 2023  Patient Name: Joseph Moore  MRN: [id number]  : 1960    Chief Complaint   Patient presents with   • Diabetes     History of Present Illness:   Joseph Moore is a 62 y.o. male who presents today for follow up on type 2 diabetes.  Diabetes was diagnosed in .  Known diabetic complications: nephropathy and retinopathy.  Current diabetic medications: Basal/bolus insulin.  Past diabetic medications: Trulicity, Ozempic (N/V, delayed gastric emptying).  He is using the Dexcom G6 CGM.  He is monitoring glucose ~288 times per day using CGM.  He is frequently adjusting insulin based on glucose readings.  He reports higher blood sugars.  He reports rare hypoglycemia.  No severe hypoglycemia.  Diet/exercise: He reports eating mostly fast food.  Avoids sugary beverages and juices.  Milk on occasion.  He reports that he is not exercising.  He did not keep appointment for diabetes education after last visit.  He reports that he forgot about the appointment.  Feet: No issues.  Foot exam today.  Last eye exam: 2022, Dr. Joseph Marquez. Moderate NPDR without DME.  ACE inhibitor/ARB: No.  Statin: Lovastatin.  CKD - followed by nephrology (Dr. Durand).    Subjective      Review of Systems   Constitutional: Negative.    Cardiovascular: Negative.    Gastrointestinal: Negative.    Endocrine: Negative.      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   • Gallstones    • GERD (gastroesophageal reflux disease)    • Gout    • Hypercholesterolemia    • Hyperlipidemia    • Hypertension    • Long term (current) use of insulin (HCC)    • Obesity     body mass index 30+-obesity   • Renal disorder    • Sleep apnea with use of continuous positive airway pressure  "(CPAP)     instructed to bring mask and tubing   • Wears glasses     reading      Past Surgical History:   Procedure Laterality Date   • CHOLECYSTECTOMY N/A 10/25/2016    Procedure: CHOLECYSTECTOMY LAPAROSCOPIC, POSSIBLE OPEN;  Surgeon: Yasemin Dobbs MD;  Location: [facility];  Service:    • CATARACT EXTRACTION      bilateral cataracts removed   • COLONOSCOPY      2015     The following portions of the patient's history were reviewed and updated as appropriate: allergies, current medications, past family history, past medical history, past social history, past surgical history and problem list.    Objective     Vitals:    02/03/23 0801   BP: 128/80   Pulse: 69   SpO2: 98%   Weight: (!) 174 kg (384 lb)   Height: 165.1 cm (65\")   PainSc: 0-No pain   Body mass index is 63.9 kg/m².    Physical Exam  Vitals reviewed.   Constitutional:       General: He is not in acute distress.  Cardiovascular:      Pulses:           Dorsalis pedis pulses are 2+ on the right side and 2+ on the left side.        Posterior tibial pulses are 2+ on the right side and 2+ on the left side.   Musculoskeletal:      Right foot: Deformity (pes planus) present.      Left foot: Deformity (pes planus) present.   Feet:      Right foot:      Protective Sensation: 6 sites tested. 6 sites sensed.      Skin integrity: Dry skin present.      Toenail Condition: Right toenails are abnormally thick.      Left foot:      Protective Sensation: 6 sites tested. 6 sites sensed.      Skin integrity: Dry skin present.      Toenail Condition: Left toenails are abnormally thick.      Comments: Vib sensation intact to feet.  Diabetic Foot Exam Performed and Monofilament Test Performed  Neurological:      Mental Status: He is alert and oriented to person, place, and time.   Psychiatric:         Mood and Affect: Mood and affect normal.         HEMOGLOBIN A1C  Lab Results   Component Value Date    HGBA1C 9.5 02/03/2023    HGBA1C 8.1 10/07/2022    HGBA1C 7.3 " 06/24/2022     GLUCOSE  Lab Results   Component Value Date    POCGLU 164 (A) 02/03/2023       Current Outpatient Medications   Medication Instructions   • Accu-Chek Guide test strip Testing 4 times per day; E11.65   • allopurinol (ZYLOPRIM) 300 mg, Oral, Daily   • amLODIPine (NORVASC) 10 MG tablet 1 tablet, Oral, Daily   • BASAGLAR KWIKPEN 60 Units, Subcutaneous, Nightly   • BD Pen Needle Mariam 2nd Gen 32G X 4 MM misc USE FOUR TIMES DAILY AS DIRECTED FOR INSULIN INJECTIONS   • carvedilol (COREG) 12.5 mg, Oral, 2 Times Daily With Meals   • Continuous Blood Gluc  (Dexcom G6 ) device 1 Device, Does not apply, Continuous, Use as directed for continuously monitoring glucose.   • Continuous Blood Gluc Sensor (Dexcom G6 Sensor) Does not apply, Every 10 Days   • Continuous Blood Gluc Transmit (Dexcom G6 Transmitter) misc 1 Device, Does not apply, Every 3 Months   • felodipine (PLENDIL) 10 mg, Oral, Daily   • ferrous sulfate 325 mg, Oral, 2 Times Daily   • furosemide (LASIX) 40 mg, Oral, 2 Times Daily   • insulin aspart (NovoLOG FlexPen) 100 UNIT/ML solution pen-injector sc pen INJECT 17 UNITS WITH BREAKFAST AND LUNCH, 20 UNITS WITH SUPPER; PLUS CORRECTION FOR HYPERGLYCEMIA, MAX 70 UNITS DAILY   • lovastatin (MEVACOR) 80 mg, Oral, Nightly   • metOLazone (ZAROXOLYN) 2.5 MG tablet 1 tablet, Daily   • omeprazole (PRILOSEC) 20 mg, Oral, Daily   • vitamin D (ERGOCALCIFEROL) 50,000 Units, Oral, Weekly       Assessment / Plan      Assessment & Plan:  1. Type 2 diabetes mellitus with hyperglycemia, with long-term current use of insulin ([facility])  A1c increased, well above goal at 9.5% today.  Reviewed CGM data and discussed with patient.  Sensor glucose average 276 for the past 2 weeks, 9% time in range , 0% low 54-69, 0% very low <54, 66% high >250.  Encouraged nutritious dietary choices, moderation of carbohydrates, regular physical activity, weight loss.  Encouraged to be drinking plenty water to maintain  hydration.  Increase insulin as listed below.  Reschedule appointment for diabetes education.  Discussed importance of weight loss for diabetes control and overall health.  He agrees to referral to [facility] weight loss clinic.    Patient Instructions     Basaglar 70 units daily.  Novolog 25 units with meals plus correction 2 units per 50 over 150.     Correction insulin (add to mealtime insulin and/or every 4 hours for hyperglycemia):   0 units Less than 150   2 units 150 - 199   4 units 200 - 249   6 units 250 - 299   8 units 300 - 349   10 units 350 - 399   12 units Over 400     - POC Glucose, Blood  - POC Glycosylated Hemoglobin (Hb A1C)  - Ambulatory Referral to Weight Management Program    2. Chronic kidney disease due to type 2 diabetes mellitus (HCC)  Limited in diabetes medications due to CKD.  Encouraged good blood sugar control.  Continue follow-up with nephrology.    3. Class 3 severe obesity due to excess calories with serious comorbidity and body mass index (BMI) of 60.0 to 69.9 in adult (HCC)  - Ambulatory Referral to Weight Management Program        Return in about 3 months (around 5/3/2023) for next scheduled follow up. He was advised to contact the office with any interval questions or concerns.    JOHANNA Mcgarry  Endocrinology  02/03/2023

## 2023-02-03 NOTE — PATIENT INSTRUCTIONS
Basaglar 70 units daily.  Novolog 25 units with meals plus correction 2 units per 50 over 150.     Correction insulin (add to mealtime insulin and/or every 4 hours for hyperglycemia):   0 units Less than 150   2 units 150 - 199   4 units 200 - 249   6 units 250 - 299   8 units 300 - 349   10 units 350 - 399   12 units Over 400

## 2023-02-20 ENCOUNTER — TELEPHONE (OUTPATIENT)
Dept: ENDOCRINOLOGY | Facility: CLINIC | Age: 63
End: 2023-02-20
Payer: COMMERCIAL

## 2023-04-07 DIAGNOSIS — Z79.4 TYPE 2 DIABETES MELLITUS WITH HYPERGLYCEMIA, WITH LONG-TERM CURRENT USE OF INSULIN: Chronic | ICD-10-CM

## 2023-04-07 DIAGNOSIS — E11.65 TYPE 2 DIABETES MELLITUS WITH HYPERGLYCEMIA, WITH LONG-TERM CURRENT USE OF INSULIN: Chronic | ICD-10-CM

## 2023-04-10 RX ORDER — PROCHLORPERAZINE 25 MG/1
SUPPOSITORY RECTAL
Qty: 9 EACH | Refills: 3 | Status: SHIPPED | OUTPATIENT
Start: 2023-04-10

## 2023-04-24 RX ORDER — INSULIN ASPART 100 [IU]/ML
INJECTION, SOLUTION INTRAVENOUS; SUBCUTANEOUS
Qty: 60 ML | Refills: 3 | Status: SHIPPED | OUTPATIENT
Start: 2023-04-24

## 2023-04-24 NOTE — TELEPHONE ENCOUNTER
Rx Refill Note  Requested Prescriptions     Pending Prescriptions Disp Refills   • NovoLOG FlexPen 100 UNIT/ML solution pen-injector sc pen [Pharmacy Med Name: NOVOLOG FLEXPEN INJ 3ML (ORANGE)] 60 mL 3     Sig: INJECT 17 UNITS WITH BREAKFAST AND LUNCH, 20 UNITS WITH SUPPER; PLUS CORRECTION FOR HYERGLYCEMIA. MAX 70 UNITS DAILY      Last office visit with prescribing clinician: 2/3/2023     Next office visit with prescribing clinician: 5/12/2023

## 2023-05-12 ENCOUNTER — OFFICE VISIT (OUTPATIENT)
Dept: ENDOCRINOLOGY | Facility: CLINIC | Age: 63
End: 2023-05-12

## 2023-05-12 VITALS
HEIGHT: 65 IN | OXYGEN SATURATION: 98 % | HEART RATE: 74 BPM | WEIGHT: 315 LBS | SYSTOLIC BLOOD PRESSURE: 124 MMHG | DIASTOLIC BLOOD PRESSURE: 79 MMHG | BODY MASS INDEX: 52.48 KG/M2

## 2023-05-12 DIAGNOSIS — E66.01 CLASS 3 SEVERE OBESITY DUE TO EXCESS CALORIES WITH SERIOUS COMORBIDITY AND BODY MASS INDEX (BMI) OF 60.0 TO 69.9 IN ADULT: Chronic | ICD-10-CM

## 2023-05-12 DIAGNOSIS — Z79.4 TYPE 2 DIABETES MELLITUS WITH HYPERGLYCEMIA, WITH LONG-TERM CURRENT USE OF INSULIN: Primary | Chronic | ICD-10-CM

## 2023-05-12 DIAGNOSIS — E11.65 TYPE 2 DIABETES MELLITUS WITH HYPERGLYCEMIA, WITH LONG-TERM CURRENT USE OF INSULIN: Primary | Chronic | ICD-10-CM

## 2023-05-12 LAB
EXPIRATION DATE: ABNORMAL
EXPIRATION DATE: NORMAL
GLUCOSE BLDC GLUCOMTR-MCNC: 136 MG/DL (ref 70–130)
HBA1C MFR BLD: 7.9 %
Lab: ABNORMAL
Lab: NORMAL

## 2023-05-12 RX ORDER — INSULIN GLARGINE 100 [IU]/ML
70 INJECTION, SOLUTION SUBCUTANEOUS NIGHTLY
Qty: 63 ML | Refills: 3 | Status: SHIPPED | OUTPATIENT
Start: 2023-05-12

## 2023-05-12 RX ORDER — INSULIN ASPART 100 [IU]/ML
INJECTION, SOLUTION INTRAVENOUS; SUBCUTANEOUS
Qty: 81 ML | Refills: 3 | Status: SHIPPED | OUTPATIENT
Start: 2023-05-12

## 2023-05-12 NOTE — PROGRESS NOTES
Office Note      Date: 2023  Patient Name: Joseph Moore  MRN: [id number]  : 1960    Chief Complaint   Patient presents with   • Diabetes       History of Present Illness  Joseph Moore is a 62 y.o. male who presents today for follow up on type 2 diabetes.   Diabetes was diagnosed in .  Known diabetic complications: nephropathy and retinopathy.  Current diabetic medications: Basal/bolus insulin.  Past diabetic medications: Trulicity, Ozempic (N/V, delayed gastric emptying).  He reports that he decided not to pursue weight management program.  He had diabetes education on 3/6/2023.  He reports that he has reduced portions and cut back on bread.  He has cut back on sweets as well.  No regular exercise.  Feet: No sores or new issues.  Foot exam up to date.  Last eye exam: 2023.  ACE inhibitor/ARB: No.  Statin: Lovastatin.    Review of Systems   Constitutional: Positive for unexpected weight change.   Cardiovascular: Negative.    Gastrointestinal: Negative.    Endocrine: Negative.      Past Medical History:   Diagnosis Date   • Arthritis    • Benign essential hypertension    • Diabetes mellitus    • Disorder associated with type 2 diabetes mellitus    • DM type 2 causing renal disease    • Eye disorder due to diabetes     type 2   • Gallstones    • GERD (gastroesophageal reflux disease)    • Gout    • Hypercholesterolemia    • Hyperlipidemia    • Hypertension    • Long term (current) use of insulin    • Obesity     body mass index 30+-obesity   • Renal disorder    • Sleep apnea with use of continuous positive airway pressure (CPAP)     instructed to bring mask and tubing   • Wears glasses     reading      Past Surgical History:   Procedure Laterality Date   • CHOLECYSTECTOMY N/A 10/25/2016    Procedure: CHOLECYSTECTOMY LAPAROSCOPIC, POSSIBLE OPEN;  Surgeon: Yasemin Dobbs MD;  Location: [facility];  Service:    • CATARACT EXTRACTION      bilateral cataracts removed   • COLONOSCOPY   "    2015     The following portions of the patient's history were reviewed and updated as appropriate: allergies, current medications, past family history, past medical history, past social history, past surgical history and problem list.    Vitals:  /79   Pulse 74   Ht 165.1 cm (65\")   Wt (!) 181 kg (399 lb)   SpO2 98%   BMI 66.40 kg/m²     Physical Exam  Vitals reviewed.   Constitutional:       General: He is not in acute distress.  Neurological:      Mental Status: He is alert and oriented to person, place, and time.   Psychiatric:         Mood and Affect: Affect normal.       Labs/Imaging    Hemoglobin A1c  Lab Results   Component Value Date    HGBA1C 7.9 05/12/2023    HGBA1C 9.5 02/03/2023    HGBA1C 8.1 10/07/2022     Glucose  Lab Results   Component Value Date    POCGLU 136 (A) 05/12/2023       Current Outpatient Medications   Medication Instructions   • Accu-Chek Guide test strip Testing 4 times per day; E11.65   • allopurinol (ZYLOPRIM) 300 mg, Oral, Daily   • amLODIPine (NORVASC) 10 MG tablet 1 tablet, Oral, Daily   • BASAGLAR KWIKPEN 70 Units, Subcutaneous, Nightly   • BD Pen Needle Mariam 2nd Gen 32G X 4 MM misc USE FOUR TIMES DAILY AS DIRECTED FOR INSULIN INJECTIONS   • carvedilol (COREG) 12.5 mg, Oral, 2 Times Daily With Meals   • Continuous Blood Gluc  (Dexcom G6 ) device 1 Device, Does not apply, Continuous, Use as directed for continuously monitoring glucose.   • Continuous Blood Gluc Sensor (Dexcom G6 Sensor) CHANGE SENSOR EVERY 10 DAYS   • Continuous Blood Gluc Transmit (Dexcom G6 Transmitter) misc 1 Device, Does not apply, Every 3 Months   • felodipine (PLENDIL) 10 mg, Oral, Daily   • ferrous sulfate 325 mg, Oral, 2 Times Daily   • furosemide (LASIX) 40 mg, Oral, 2 Times Daily   • lovastatin (MEVACOR) 80 mg, Oral, Nightly   • metOLazone (ZAROXOLYN) 2.5 MG tablet 1 tablet, Daily   • NovoLOG FlexPen 100 UNIT/ML solution pen-injector sc pen INJECT 25 UNITS WITH MEALS; PLUS " CORRECTION FOR HYERGLYCEMIA. MAX 90 UNITS DAILY   • omeprazole (PRILOSEC) 20 mg, Oral, Daily   • vitamin D (ERGOCALCIFEROL) 50,000 Units, Oral, Weekly       Assessment & Plan  1. Type 2 diabetes mellitus with hyperglycemia, with long-term current use of insulin  Diabetes remains uncontrolled with A1c of 7.9% today, but has significantly improved.  Weight is up 15 pounds.  Reviewed Dexcom CGM data and discussed with patient.  Sensor glucose average 177 for the past 2 weeks (193 for 2 weeks prior to this), 57% time in range , <1% low 54-69, 0% very low <54, 8% high >250.  Variable postprandial hyperglycemia.  Some fasting blood sugars down to goal.  Continue Basaglar 70 units daily.  Continue Novolog 25 units with meals plus correction 2 units per 50 >150.  Encouraged nutritious dietary choices, moderation of carbohydrates, avoidance of added sugar, caloric restriction, regular exercise and weight loss.  - POC Glucose, Blood  - POC Glycosylated Hemoglobin (Hb A1C)  - Insulin Glargine (BASAGLAR KWIKPEN) 100 UNIT/ML injection pen; Inject 70 Units under the skin into the appropriate area as directed Every Night.  Dispense: 63 mL; Refill: 3  - NovoLOG FlexPen 100 UNIT/ML solution pen-injector sc pen; INJECT 25 UNITS WITH MEALS; PLUS CORRECTION FOR HYERGLYCEMIA. MAX 90 UNITS DAILY  Dispense: 81 mL; Refill: 3    2. Class 3 severe obesity due to excess calories with serious comorbidity and body mass index (BMI) of 60.0 to 69.9 in adult  He does not want to pursue weight management program at this time.  Encouraged to keep working on nutritious dietary choices, moderation of carbohydrates, avoiding added sugar, portion control.  Encouraged regular physical activity.  Recommend to try apps such as My Fitness Pal or Lose It.      Return in about 3 months (around 8/12/2023) for next scheduled follow up. He was advised to contact the office with any interval questions or concerns.    Palmira Montilla,  PA  Endocrinology  05/12/2023

## 2023-11-13 DIAGNOSIS — Z79.4 TYPE 2 DIABETES MELLITUS WITH HYPERGLYCEMIA, WITH LONG-TERM CURRENT USE OF INSULIN: Chronic | ICD-10-CM

## 2023-11-13 DIAGNOSIS — E11.65 TYPE 2 DIABETES MELLITUS WITH HYPERGLYCEMIA, WITH LONG-TERM CURRENT USE OF INSULIN: Chronic | ICD-10-CM

## 2023-11-13 RX ORDER — PROCHLORPERAZINE 25 MG/1
1 SUPPOSITORY RECTAL
Qty: 1 EACH | Refills: 3 | Status: SHIPPED | OUTPATIENT
Start: 2023-11-13

## 2023-11-13 NOTE — TELEPHONE ENCOUNTER
Rx Refill Note  Requested Prescriptions      No prescriptions requested or ordered in this encounter        Last office visit with prescribing clinician: Visit date not found      Next office visit with prescribing clinician: 12/18/2023       Ольга Carlton (Jodi)  11/13/23, 14:40 EST

## 2023-12-18 ENCOUNTER — OFFICE VISIT (OUTPATIENT)
Dept: ENDOCRINOLOGY | Facility: CLINIC | Age: 63
End: 2023-12-18
Payer: COMMERCIAL

## 2023-12-18 VITALS
HEART RATE: 72 BPM | SYSTOLIC BLOOD PRESSURE: 136 MMHG | HEIGHT: 65 IN | OXYGEN SATURATION: 98 % | BODY MASS INDEX: 52.48 KG/M2 | WEIGHT: 315 LBS | DIASTOLIC BLOOD PRESSURE: 84 MMHG

## 2023-12-18 DIAGNOSIS — E11.65 TYPE 2 DIABETES MELLITUS WITH HYPERGLYCEMIA, WITH LONG-TERM CURRENT USE OF INSULIN: Primary | ICD-10-CM

## 2023-12-18 DIAGNOSIS — Z79.4 TYPE 2 DIABETES MELLITUS WITH HYPERGLYCEMIA, WITH LONG-TERM CURRENT USE OF INSULIN: Primary | ICD-10-CM

## 2023-12-18 LAB
EXPIRATION DATE: ABNORMAL
GLUCOSE BLDC GLUCOMTR-MCNC: 306 MG/DL (ref 70–130)
HBA1C MFR BLD: 7.7 % (ref 4.5–5.7)
Lab: ABNORMAL

## 2023-12-18 RX ORDER — INSULIN ASPART 100 [IU]/ML
INJECTION, SOLUTION INTRAVENOUS; SUBCUTANEOUS
Qty: 81 ML | Refills: 3 | Status: SHIPPED | OUTPATIENT
Start: 2023-12-18

## 2023-12-18 RX ORDER — INSULIN DEGLUDEC 200 U/ML
80 INJECTION, SOLUTION SUBCUTANEOUS NIGHTLY
Qty: 36 ML | Refills: 3 | Status: SHIPPED | OUTPATIENT
Start: 2023-12-18

## 2023-12-18 NOTE — PROGRESS NOTES
"Chief Complaint   Patient presents with    Diabetes     Follow up        HPI:   Joseph Moore is a 62 y.o.male who returns to Endocrine Clinic for f/u evaluation of -. Last clinic visit -.  His/Her history is as follows:    Interim Events:    1) Type - diabetes with associated complications of -:  - diagnosed in    Current DM Medications:    Glucometer/ Insulin Pump Review:    DM Health Maintenance:  Ophtho: Last visit -   Podiatry: Last visit -   Monofilament / Foot exam:  Lipids:  Urine Microalb/Cr ratio:  TSH:  Aspirin:        Review of Systems    The following portions of the patient's history were reviewed and updated as appropriate: allergies, current medications, past family history, past medical history, past social history, past surgical history and problem list.    /84   Pulse 72   Ht 165.1 cm (65\")   Wt (!) 180 kg (397 lb)   SpO2 98%   BMI 66.06 kg/m²   Physical Exam      LABS/IMAGING:   Results for orders placed or performed in visit on 12/18/23   POC Glycosylated Hemoglobin (Hb A1C)    Specimen: Blood   Result Value Ref Range    Hemoglobin A1C 7.7 (A) 4.5 - 5.7 %    Lot Number [id number]     Expiration Date 07/30/25    POC Glucose, Blood    Specimen: Blood   Result Value Ref Range    Glucose 306 (A) 70 - 130 mg/dL       ASSESSMENT/PLAN:            "

## 2024-01-24 ENCOUNTER — TELEPHONE (OUTPATIENT)
Dept: ENDOCRINOLOGY | Facility: CLINIC | Age: 64
End: 2024-01-24
Payer: COMMERCIAL

## 2024-01-24 DIAGNOSIS — E11.65 TYPE 2 DIABETES MELLITUS WITH HYPERGLYCEMIA, WITH LONG-TERM CURRENT USE OF INSULIN: ICD-10-CM

## 2024-01-24 DIAGNOSIS — Z79.4 TYPE 2 DIABETES MELLITUS WITH HYPERGLYCEMIA, WITH LONG-TERM CURRENT USE OF INSULIN: ICD-10-CM

## 2024-01-24 RX ORDER — INSULIN DEGLUDEC 200 U/ML
90 INJECTION, SOLUTION SUBCUTANEOUS NIGHTLY
Status: SHIPPED
Start: 2024-01-24

## 2024-01-24 RX ORDER — METFORMIN HYDROCHLORIDE 500 MG/1
500 TABLET, EXTENDED RELEASE ORAL
Qty: 90 TABLET | Refills: 1 | Status: SHIPPED | OUTPATIENT
Start: 2024-01-24

## 2024-01-24 NOTE — TELEPHONE ENCOUNTER
Spoke with patient. D/C'd the Mounjaro 2.5 mg samples as he had N/V/D with the medication. Will start low dose metformin  mg daily with dinner. Will change his insulin doses to:  Tresiba U-200: 90 units nightly  Novolo units TID with meals + CF of 2 units: 50 > 150    Asked pt to bring his dexcom reader device to clinic at his convenience in 2-3 weeks so that it can be downloaded and reviewed.     Electronically Signed: Marylou Chau MD

## 2024-02-23 RX ORDER — PEN NEEDLE, DIABETIC 32GX 5/32"
NEEDLE, DISPOSABLE MISCELLANEOUS
Qty: 400 EACH | Refills: 3 | Status: SHIPPED | OUTPATIENT
Start: 2024-02-23

## 2024-04-08 DIAGNOSIS — Z79.4 TYPE 2 DIABETES MELLITUS WITH HYPERGLYCEMIA, WITH LONG-TERM CURRENT USE OF INSULIN: Chronic | ICD-10-CM

## 2024-04-08 DIAGNOSIS — E11.65 TYPE 2 DIABETES MELLITUS WITH HYPERGLYCEMIA, WITH LONG-TERM CURRENT USE OF INSULIN: Chronic | ICD-10-CM

## 2024-04-08 RX ORDER — PROCHLORPERAZINE 25 MG/1
SUPPOSITORY RECTAL
Qty: 9 EACH | Refills: 3 | Status: SHIPPED | OUTPATIENT
Start: 2024-04-08

## 2024-04-16 ENCOUNTER — OFFICE VISIT (OUTPATIENT)
Dept: ENDOCRINOLOGY | Facility: CLINIC | Age: 64
End: 2024-04-16
Payer: COMMERCIAL

## 2024-04-16 VITALS
HEIGHT: 65 IN | HEART RATE: 72 BPM | WEIGHT: 315 LBS | SYSTOLIC BLOOD PRESSURE: 128 MMHG | RESPIRATION RATE: 18 BRPM | DIASTOLIC BLOOD PRESSURE: 78 MMHG | OXYGEN SATURATION: 97 % | BODY MASS INDEX: 52.48 KG/M2

## 2024-04-16 DIAGNOSIS — Z79.4 TYPE 2 DIABETES MELLITUS WITH HYPERGLYCEMIA, WITH LONG-TERM CURRENT USE OF INSULIN: Primary | ICD-10-CM

## 2024-04-16 DIAGNOSIS — E11.65 TYPE 2 DIABETES MELLITUS WITH HYPERGLYCEMIA, WITH LONG-TERM CURRENT USE OF INSULIN: Primary | ICD-10-CM

## 2024-04-16 LAB
ALBUMIN SERPL-MCNC: 3.4 G/DL (ref 3.5–5.2)
ALBUMIN UR-MCNC: <1.2 MG/DL
ALBUMIN/GLOB SERPL: 0.7 G/DL
ALP SERPL-CCNC: 71 U/L (ref 39–117)
ALT SERPL W P-5'-P-CCNC: 10 U/L (ref 1–41)
ANION GAP SERPL CALCULATED.3IONS-SCNC: 13 MMOL/L (ref 5–15)
AST SERPL-CCNC: 16 U/L (ref 1–40)
BILIRUB SERPL-MCNC: 0.6 MG/DL (ref 0–1.2)
BUN SERPL-MCNC: 61 MG/DL (ref 8–23)
BUN/CREAT SERPL: 25.2 (ref 7–25)
CALCIUM SPEC-SCNC: 9 MG/DL (ref 8.6–10.5)
CHLORIDE SERPL-SCNC: 96 MMOL/L (ref 98–107)
CHOLEST SERPL-MCNC: 143 MG/DL (ref 0–200)
CO2 SERPL-SCNC: 28 MMOL/L (ref 22–29)
CREAT SERPL-MCNC: 2.42 MG/DL (ref 0.76–1.27)
CREAT UR-MCNC: 37.7 MG/DL
DEPRECATED RDW RBC AUTO: 47.9 FL (ref 37–54)
EGFRCR SERPLBLD CKD-EPI 2021: 29.7 ML/MIN/1.73
ERYTHROCYTE [DISTWIDTH] IN BLOOD BY AUTOMATED COUNT: 15.3 % (ref 12.3–15.4)
EXPIRATION DATE: ABNORMAL
GLOBULIN UR ELPH-MCNC: 4.6 GM/DL
GLUCOSE BLDC GLUCOMTR-MCNC: 210 MG/DL (ref 70–130)
GLUCOSE SERPL-MCNC: 192 MG/DL (ref 65–99)
HBA1C MFR BLD: 7.9 % (ref 4.5–5.7)
HCT VFR BLD AUTO: 35.2 % (ref 37.5–51)
HDLC SERPL-MCNC: 37 MG/DL (ref 40–60)
HGB BLD-MCNC: 11.4 G/DL (ref 13–17.7)
LDLC SERPL CALC-MCNC: 85 MG/DL (ref 0–100)
LDLC/HDLC SERPL: 2.23 {RATIO}
Lab: ABNORMAL
MCH RBC QN AUTO: 28.2 PG (ref 26.6–33)
MCHC RBC AUTO-ENTMCNC: 32.4 G/DL (ref 31.5–35.7)
MCV RBC AUTO: 87.1 FL (ref 79–97)
MICROALBUMIN/CREAT UR: NORMAL MG/G{CREAT}
PLATELET # BLD AUTO: 199 10*3/MM3 (ref 140–450)
PMV BLD AUTO: 11.3 FL (ref 6–12)
POTASSIUM SERPL-SCNC: 3.5 MMOL/L (ref 3.5–5.2)
PROT SERPL-MCNC: 8 G/DL (ref 6–8.5)
RBC # BLD AUTO: 4.04 10*6/MM3 (ref 4.14–5.8)
SODIUM SERPL-SCNC: 137 MMOL/L (ref 136–145)
TRIGL SERPL-MCNC: 118 MG/DL (ref 0–150)
TSH SERPL DL<=0.05 MIU/L-ACNC: 2.57 UIU/ML (ref 0.27–4.2)
VIT B12 BLD-MCNC: 1372 PG/ML (ref 211–946)
VLDLC SERPL-MCNC: 21 MG/DL (ref 5–40)
WBC NRBC COR # BLD AUTO: 8.13 10*3/MM3 (ref 3.4–10.8)

## 2024-04-16 PROCEDURE — 82043 UR ALBUMIN QUANTITATIVE: CPT | Performed by: INTERNAL MEDICINE

## 2024-04-16 PROCEDURE — 80061 LIPID PANEL: CPT | Performed by: INTERNAL MEDICINE

## 2024-04-16 PROCEDURE — 82607 VITAMIN B-12: CPT | Performed by: INTERNAL MEDICINE

## 2024-04-16 PROCEDURE — 82570 ASSAY OF URINE CREATININE: CPT | Performed by: INTERNAL MEDICINE

## 2024-04-16 PROCEDURE — 80050 GENERAL HEALTH PANEL: CPT | Performed by: INTERNAL MEDICINE

## 2024-04-16 RX ORDER — METFORMIN HYDROCHLORIDE 500 MG/1
500 TABLET, EXTENDED RELEASE ORAL
Qty: 90 TABLET | Refills: 3 | Status: SHIPPED | OUTPATIENT
Start: 2024-04-16 | End: 2024-04-28

## 2024-04-16 RX ORDER — INSULIN DEGLUDEC 200 U/ML
60 INJECTION, SOLUTION SUBCUTANEOUS 2 TIMES DAILY
Qty: 18 ML | Refills: 11 | Status: SHIPPED | OUTPATIENT
Start: 2024-04-16

## 2024-04-16 RX ORDER — DULAGLUTIDE 0.75 MG/.5ML
0.75 INJECTION, SOLUTION SUBCUTANEOUS WEEKLY
Qty: 2 ML | Refills: 5 | Status: SHIPPED | OUTPATIENT
Start: 2024-04-16

## 2024-04-16 RX ORDER — METFORMIN HYDROCHLORIDE 500 MG/1
1000 TABLET, EXTENDED RELEASE ORAL
Qty: 180 TABLET | Refills: 3 | Status: SHIPPED | OUTPATIENT
Start: 2024-04-16 | End: 2024-04-16 | Stop reason: DRUGHIGH

## 2024-04-16 RX ORDER — INSULIN GLARGINE 100 [IU]/ML
INJECTION, SOLUTION SUBCUTANEOUS
COMMUNITY
End: 2024-04-16 | Stop reason: SDUPTHER

## 2024-04-16 NOTE — LETTER
April 28, 2024       No Recipients    Patient: Joseph Moore   YOB: 1960   Date of Visit: 4/16/2024     Dear Mason Medeiros MD:       I had the pleasure of seeing your patient, Joseph Moore.   Below are the relevant portions of my assessment and plan of care.    If you have questions, please do not hesitate to call me.      Sincerely,        Marylou Chau MD        CC:   No Recipients    Marylou Chau MD  04/28/24 1837  Sign when Signing Visit  Chief Complaint   Patient presents with   • Diabetes     Follow-up       HPI:   Joseph Moore is a 63 y.o.male who returns to Endocrine Clinic for f/u evaluation of his Type 2 diabetes. Last clinic visit was on 12/18/2023. 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  - Is followed by Nephrology, Dr. Raúl Durand, for his CKD   - Did not tolerate sample of Mounjaro 2.5 mg weekly given to him in 12/2023    Current DM Medications:  - metformin  mg daily with dinner  - Tresiba (U-200): is supposed to be taking 90 units nightly. Pt states he is taking 80 units most nights and takes 90 units only when his nighttime glucose is > 300.   - Novolog to: 24 units TID AC meals + CF of 2 units: 50 > 150    Dexcom CGM Review:  SG > 250: 32% (goal < 5%)  SG (181 - 250): 32% (goal < 25%)  SG (70 - 180): 36% (Goal > 70%)   SG (54 - 69): 0% (goal < 4%)  SG < 54: 0% (goal < 1%)  Avg Glucose: 213  Glucose Variability: 31.1%  (goal </= 35%)  Glucose Management Indicator: 8.4%     Hyperglycemia noted throughout the  "day, most days. There are a few days with good glycemic control on same doses of insulin. Not clear if days of significant hyperglycemia are due to missed insulin doses.    DM Health Maintenance:  Ophtho: Last visit - (05/02/2023) moderate NPDR w/o ME, [facility]  Podiatry: Last visit - no recent visit  Monofilament / Foot exam: DUE  Lipids: (11/2023) TChol 143, , HDL 37, LDL 85 -  on lovastatin 40 mg daily   Urine Microalb/Cr ratio: (04/2024) <1.2  TSH: (04/2024) 2.570  CBC: (04/2024) Hgb 11.4  CMP: (04/2024) Cr 2.42, eGFR 29.7, LFTs WNL   Vit B12: (04/2024) 1,372    Review of Systems   Constitutional: Negative.         Wt stable   HENT: Negative.     Eyes: Negative.    Respiratory: Negative.     Cardiovascular: Negative.    Gastrointestinal: Negative.    Endocrine:        Hyperglycemia   Genitourinary: Negative.    Musculoskeletal: Negative.    Skin: Negative.    Allergic/Immunologic: Negative.    Neurological: Negative.    Hematological: Negative.    Psychiatric/Behavioral: Negative.         The following portions of the patient's history were reviewed and updated as appropriate: allergies, current medications, past family history, past medical history, past social history, past surgical history and problem list.      /78 (BP Location: Right arm, Patient Position: Sitting, Cuff Size: Large Adult)   Pulse 72   Resp 18   Ht 165.1 cm (65\")   Wt (!) 180 kg (396 lb)   SpO2 97%   BMI 65.90 kg/m²   Physical Exam  Vitals reviewed.   Constitutional:       General: He is not in acute distress.     Appearance: He is well-developed. He is obese. He is not diaphoretic.   HENT:      Head: Normocephalic.   Eyes:      Conjunctiva/sclera: Conjunctivae normal.      Pupils: Pupils are equal, round, and reactive to light.   Neck:      Thyroid: No thyromegaly.      Trachea: No tracheal deviation.      Comments: No palpable thyroid nodules    Cardiovascular:      Rate and Rhythm: Normal rate and regular rhythm. "      Heart sounds: Normal heart sounds. No murmur heard.  Pulmonary:      Effort: Pulmonary effort is normal. No respiratory distress.      Breath sounds: Normal breath sounds.   Abdominal:      General: Bowel sounds are normal.      Palpations: Abdomen is soft. There is no mass.      Tenderness: There is no abdominal tenderness.      Comments: No scarring at insulin injection sites     Musculoskeletal:      Cervical back: Neck supple.   Lymphadenopathy:      Cervical: No cervical adenopathy.   Skin:     General: Skin is warm and dry.      Findings: No erythema.   Neurological:      Mental Status: He is alert and oriented to person, place, and time.      Cranial Nerves: No cranial nerve deficit.   Psychiatric:         Behavior: Behavior normal.       LABS/IMAGING: prior and outside labs / imaging reviewed and summarized in HPI  Results for orders placed or performed in visit on 04/16/24   CBC (No Diff)    Specimen: Blood   Result Value Ref Range    WBC 8.13 3.40 - 10.80 10*3/mm3    RBC 4.04 (L) 4.14 - 5.80 10*6/mm3    Hemoglobin 11.4 (L) 13.0 - 17.7 g/dL    Hematocrit 35.2 (L) 37.5 - 51.0 %    MCV 87.1 79.0 - 97.0 fL    MCH 28.2 26.6 - 33.0 pg    MCHC 32.4 31.5 - 35.7 g/dL    RDW 15.3 12.3 - 15.4 %    RDW-SD 47.9 37.0 - 54.0 fl    MPV 11.3 6.0 - 12.0 fL    Platelets 199 140 - 450 10*3/mm3   Comprehensive Metabolic Panel    Specimen: Blood   Result Value Ref Range    Glucose 192 (H) 65 - 99 mg/dL    BUN 61 (H) 8 - 23 mg/dL    Creatinine 2.42 (H) 0.76 - 1.27 mg/dL    Sodium 137 136 - 145 mmol/L    Potassium 3.5 3.5 - 5.2 mmol/L    Chloride 96 (L) 98 - 107 mmol/L    CO2 28.0 22.0 - 29.0 mmol/L    Calcium 9.0 8.6 - 10.5 mg/dL    Total Protein 8.0 6.0 - 8.5 g/dL    Albumin 3.4 (L) 3.5 - 5.2 g/dL    ALT (SGPT) 10 1 - 41 U/L    AST (SGOT) 16 1 - 40 U/L    Alkaline Phosphatase 71 39 - 117 U/L    Total Bilirubin 0.6 0.0 - 1.2 mg/dL    Globulin 4.6 gm/dL    A/G Ratio 0.7 g/dL    BUN/Creatinine Ratio 25.2 (H) 7.0 - 25.0     Anion Gap 13.0 5.0 - 15.0 mmol/L    eGFR 29.7 (L) >60.0 mL/min/1.73   Lipid Panel    Specimen: Blood   Result Value Ref Range    Total Cholesterol 143 0 - 200 mg/dL    Triglycerides 118 0 - 150 mg/dL    HDL Cholesterol 37 (L) 40 - 60 mg/dL    LDL Cholesterol  85 0 - 100 mg/dL    VLDL Cholesterol 21 5 - 40 mg/dL    LDL/HDL Ratio 2.23    Microalbumin / Creatinine Urine Ratio - Urine, Clean Catch    Specimen: Urine, Clean Catch   Result Value Ref Range    Microalbumin/Creatinine Ratio      Creatinine, Urine 37.7 mg/dL    Microalbumin, Urine <1.2 mg/dL   TSH    Specimen: Blood   Result Value Ref Range    TSH 2.570 0.270 - 4.200 uIU/mL   Vitamin B12    Specimen: Arm, Left; Blood   Result Value Ref Range    Vitamin B-12 1,372 (H) 211 - 946 pg/mL   POC Glucose, Blood    Specimen: Blood   Result Value Ref Range    Glucose 210 (A) 70 - 130 mg/dL   POC Glycosylated Hemoglobin (Hb A1C)    Specimen: Blood   Result Value Ref Range    Hemoglobin A1C 7.9 (A) 4.5 - 5.7 %    Lot Number [id number]     Expiration Date 2025.01.24        ASSESSMENT/PLAN:    1) Type 2 diabetes with associated complications of CKD Stage 3, 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 POCT A1C% was 7.9 today in the setting of anemia which causes a falsely lower A1C%. The estimated A1C% per his CGM was 8.4%. His glucose time in range () was only 36%, goal is > 70%.     - Did not tolerate sample of Mounjaro 2.5 mg weekly given to him in 12/2023  - Pt has had an increase in his Cr. Will D/C metformin  mg daily. Pt notified and told to discuss with his nephrologist. Will sned copy of labs and this note to Dr. Durand.     - Start Trulicity 0.75 mg weekly. Pt tolerated this lower dose in the past.   - Change Tresiba (U-200) to : 60 unit in AM and 60 units nightly  - Continue: Novolog 24 units TID AC meals + CF of 2 units: 50 > 150  - Written instructions reviewed with patient    - Pt to call if having frequent BGs < 80 or > 200.     RTC  08/14/2024    Electronically Signed: Marylou Chau MD      Electronically Signed: Marylou Chau MD

## 2024-04-16 NOTE — PROGRESS NOTES
Chief Complaint   Patient presents with    Diabetes     Follow-up       HPI:   Joseph Moore is a 63 y.o.male who returns to [facility] for f/u evaluation of his Type 2 diabetes. Last clinic visit was on 12/18/2023. 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  - Is followed by Nephrology, Dr. Raúl Durand, for his CKD   - Did not tolerate sample of Mounjaro 2.5 mg weekly given to him in 12/2023    Current DM Medications:  - metformin  mg daily with dinner  - Tresiba (U-200): is supposed to be taking 90 units nightly. Pt states he is taking 80 units most nights and takes 90 units only when his nighttime glucose is > 300.   - Novolog to: 24 units TID AC meals + CF of 2 units: 50 > 150    Dexcom CGM Review:  SG > 250: 32% (goal < 5%)  SG (181 - 250): 32% (goal < 25%)  SG (70 - 180): 36% (Goal > 70%)   SG (54 - 69): 0% (goal < 4%)  SG < 54: 0% (goal < 1%)  Avg Glucose: 213  Glucose Variability: 31.1%  (goal </= 35%)  Glucose Management Indicator: 8.4%     Hyperglycemia noted throughout the day, most days. There are a few days with good glycemic control on same doses of insulin. Not clear if days of significant hyperglycemia are due to missed insulin doses.    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         Wt stable   HENT: Negative.     Eyes: Negative.    Respiratory: Negative.     Cardiovascular: Negative.    Gastrointestinal: Negative.    Endocrine:        Hyperglycemia   Genitourinary: Negative.    Musculoskeletal: Negative.    Skin: Negative.    Allergic/Immunologic: Negative.    Neurological: Negative.    Hematological: Negative.    Psychiatric/Behavioral: Negative.         The following portions of the patient's history were reviewed and updated as appropriate: allergies, current medications, past family history, past medical history, past social history, past surgical history and problem list.      /78 (BP Location: Right arm, Patient Position: Sitting, Cuff Size: Large Adult)   Pulse 72   Resp 18   Ht 165.1 cm (65\")   Wt (!) 180 kg (396 lb)   SpO2 97%   BMI 65.90 kg/m²   Physical Exam  Vitals reviewed.   Constitutional:       General: He is not in acute distress.     Appearance: He is well-developed. He is obese. He is not diaphoretic.   HENT:      Head: Normocephalic.   Eyes:      Conjunctiva/sclera: Conjunctivae normal.      Pupils: Pupils are equal, round, and reactive to light.   Neck:      Thyroid: No thyromegaly.      Trachea: No tracheal deviation.      Comments: No palpable thyroid nodules    Cardiovascular:      Rate and Rhythm: Normal rate and regular rhythm.      Heart sounds: Normal heart sounds. No murmur heard.  Pulmonary:      Effort: Pulmonary effort is normal. No respiratory distress.      Breath sounds: Normal breath sounds.   Abdominal:      General: Bowel sounds are normal.      Palpations: Abdomen is soft. There is no mass.      Tenderness: There is no abdominal tenderness.      Comments: No scarring at insulin injection sites     Musculoskeletal:      Cervical back: Neck supple.   Lymphadenopathy:      " Cervical: No cervical adenopathy.   Skin:     General: Skin is warm and dry.      Findings: No erythema.   Neurological:      Mental Status: He is alert and oriented to person, place, and time.      Cranial Nerves: No cranial nerve deficit.   Psychiatric:         Behavior: Behavior normal.       LABS/IMAGING: prior and outside labs / imaging reviewed and summarized in HPI  Results for orders placed or performed in visit on 04/16/24   CBC (No Diff)    Specimen: Blood   Result Value Ref Range    WBC 8.13 3.40 - 10.80 10*3/mm3    RBC 4.04 (L) 4.14 - 5.80 10*6/mm3    Hemoglobin 11.4 (L) 13.0 - 17.7 g/dL    Hematocrit 35.2 (L) 37.5 - 51.0 %    MCV 87.1 79.0 - 97.0 fL    MCH 28.2 26.6 - 33.0 pg    MCHC 32.4 31.5 - 35.7 g/dL    RDW 15.3 12.3 - 15.4 %    RDW-SD 47.9 37.0 - 54.0 fl    MPV 11.3 6.0 - 12.0 fL    Platelets 199 140 - 450 10*3/mm3   Comprehensive Metabolic Panel    Specimen: Blood   Result Value Ref Range    Glucose 192 (H) 65 - 99 mg/dL    BUN 61 (H) 8 - 23 mg/dL    Creatinine 2.42 (H) 0.76 - 1.27 mg/dL    Sodium 137 136 - 145 mmol/L    Potassium 3.5 3.5 - 5.2 mmol/L    Chloride 96 (L) 98 - 107 mmol/L    CO2 28.0 22.0 - 29.0 mmol/L    Calcium 9.0 8.6 - 10.5 mg/dL    Total Protein 8.0 6.0 - 8.5 g/dL    Albumin 3.4 (L) 3.5 - 5.2 g/dL    ALT (SGPT) 10 1 - 41 U/L    AST (SGOT) 16 1 - 40 U/L    Alkaline Phosphatase 71 39 - 117 U/L    Total Bilirubin 0.6 0.0 - 1.2 mg/dL    Globulin 4.6 gm/dL    A/G Ratio 0.7 g/dL    BUN/Creatinine Ratio 25.2 (H) 7.0 - 25.0    Anion Gap 13.0 5.0 - 15.0 mmol/L    eGFR 29.7 (L) >60.0 mL/min/1.73   Lipid Panel    Specimen: Blood   Result Value Ref Range    Total Cholesterol 143 0 - 200 mg/dL    Triglycerides 118 0 - 150 mg/dL    HDL Cholesterol 37 (L) 40 - 60 mg/dL    LDL Cholesterol  85 0 - 100 mg/dL    VLDL Cholesterol 21 5 - 40 mg/dL    LDL/HDL Ratio 2.23    Microalbumin / Creatinine Urine Ratio - Urine, Clean Catch    Specimen: Urine, Clean Catch   Result Value Ref Range     Microalbumin/Creatinine Ratio      Creatinine, Urine 37.7 mg/dL    Microalbumin, Urine <1.2 mg/dL   TSH    Specimen: Blood   Result Value Ref Range    TSH 2.570 0.270 - 4.200 uIU/mL   Vitamin B12    Specimen: Arm, Left; Blood   Result Value Ref Range    Vitamin B-12 1,372 (H) 211 - 946 pg/mL   POC Glucose, Blood    Specimen: Blood   Result Value Ref Range    Glucose 210 (A) 70 - 130 mg/dL   POC Glycosylated Hemoglobin (Hb A1C)    Specimen: Blood   Result Value Ref Range    Hemoglobin A1C 7.9 (A) 4.5 - 5.7 %    Lot Number [id number]     Expiration Date 2025.01.24        ASSESSMENT/PLAN:    1) Type 2 diabetes with associated complications of CKD Stage 3, 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 POCT A1C% was 7.9 today in the setting of anemia which causes a falsely lower A1C%. The estimated A1C% per his CGM was 8.4%. His glucose time in range () was only 36%, goal is > 70%.     - Did not tolerate sample of Mounjaro 2.5 mg weekly given to him in 12/2023  - Pt has had an increase in his Cr. Will D/C metformin  mg daily. Pt notified and told to discuss with his nephrologist. Will sned copy of labs and this note to Dr. Durand.     - Start Trulicity 0.75 mg weekly. Pt tolerated this lower dose in the past.   - Change Tresiba (U-200) to : 60 unit in AM and 60 units nightly  - Continue: Novolog 24 units TID AC meals + CF of 2 units: 50 > 150  - Written instructions reviewed with patient    - Pt to call if having frequent BGs < 80 or > 200.     RTC 08/14/2024    Electronically Signed: Marylou Chau MD      Electronically Signed: Marylou Chau MD

## 2024-08-14 ENCOUNTER — OFFICE VISIT (OUTPATIENT)
Dept: ENDOCRINOLOGY | Facility: CLINIC | Age: 64
End: 2024-08-14
Payer: COMMERCIAL

## 2024-08-14 VITALS
WEIGHT: 315 LBS | SYSTOLIC BLOOD PRESSURE: 124 MMHG | OXYGEN SATURATION: 98 % | HEIGHT: 65 IN | BODY MASS INDEX: 52.48 KG/M2 | DIASTOLIC BLOOD PRESSURE: 80 MMHG | HEART RATE: 68 BPM

## 2024-08-14 DIAGNOSIS — Z79.4 TYPE 2 DIABETES MELLITUS WITH HYPERGLYCEMIA, WITH LONG-TERM CURRENT USE OF INSULIN: Primary | ICD-10-CM

## 2024-08-14 DIAGNOSIS — E11.65 TYPE 2 DIABETES MELLITUS WITH HYPERGLYCEMIA, WITH LONG-TERM CURRENT USE OF INSULIN: Primary | ICD-10-CM

## 2024-08-14 LAB
EXPIRATION DATE: ABNORMAL
EXPIRATION DATE: ABNORMAL
GLUCOSE BLDC GLUCOMTR-MCNC: 161 MG/DL (ref 70–130)
HBA1C MFR BLD: 8.3 % (ref 4.5–5.7)
Lab: ABNORMAL
Lab: ABNORMAL

## 2024-08-14 RX ORDER — INSULIN HUMAN 500 [IU]/ML
70 INJECTION, SOLUTION SUBCUTANEOUS
Qty: 14 ML | Refills: 5 | Status: SHIPPED | OUTPATIENT
Start: 2024-08-14

## 2024-08-14 NOTE — PROGRESS NOTES
Chief Complaint   Patient presents with    Diabetes     Follow-up       HPI:   Joseph Moore is a 63 y.o.male who returns to Endocrine Clinic for f/u evaluation of his Type 2 diabetes. Last clinic visit was on 04/16/2024. 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  - Is followed by Nephrology, Dr. Raúl Durand, for his CKD   - Did not tolerate sample of Mounjaro 2.5 mg weekly given to him in 12/2023  - Did not tolerate Trulicity 0.75 mg weekly tried again in 04/2024. Her took 4 doses.   - Stopped Metformin  mg daily in 04/2024, due to change in his Cr.     Current DM Medications: Reports forgetting doses of insulin at times  - Tresiba (U-200): is taking 60 units BID  - Novolog to: 24 units TID AC meals + CF of 2 units: 50 > 150    Dexcom CGM Review:  SG > 250: 46% (goal < 5%)  SG (181 - 250): 34% (goal < 25%)  SG (70 - 180): 20% (Goal > 70%)   SG (54 - 69): 0% (goal < 4%)  SG < 54: 0% (goal < 1%)  Avg Glucose: 245  Glucose Variability: 28.6%  (goal </= 35%)  Glucose Management Indicator: 9.2%     Hyperglycemia noted throughout the day, most days. There are a few days with good glycemic control on same doses of insulin. Not clear if days of significant hyperglycemia are due to missed insulin doses.    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         Wt stable   HENT: Negative.     Eyes: Negative.    Respiratory: Negative.     Cardiovascular: Negative.    Gastrointestinal: Negative.    Endocrine:        Hyperglycemia   Genitourinary: Negative.    Musculoskeletal: Negative.    Skin: Negative.    Allergic/Immunologic: Negative.    Neurological: Negative.    Hematological: Negative.    Psychiatric/Behavioral: Negative.       The following portions of the patient's history were reviewed and updated as appropriate: allergies, current medications, past family history, past medical history, past social history, past surgical history and problem list.    /80 (BP Location: Left arm, Patient Position: Sitting, Cuff Size: Adult)   Pulse 68   Ht 165.1 cm (65\")   Wt (!) 182 kg (402 lb 3.2 oz)   SpO2 98%   BMI 66.93 kg/m²   Physical Exam  Vitals reviewed.   Constitutional:       General: He is not in acute distress.     Appearance: He is well-developed. He is obese. He is not diaphoretic.   HENT:      Head: Normocephalic.   Eyes:      Conjunctiva/sclera: Conjunctivae normal.      Pupils: Pupils are equal, round, and reactive to light.   Neck:      Thyroid: No thyromegaly.      Trachea: No tracheal deviation.      Comments: No palpable thyroid nodules    Cardiovascular:      Rate and Rhythm: Normal rate and regular rhythm.      Heart sounds: Normal heart sounds. No murmur heard.  Pulmonary:      Effort: Pulmonary effort is normal. No respiratory distress.      Breath sounds: Normal breath sounds.   Abdominal:      General: Bowel sounds are normal.      Palpations: Abdomen is soft. There is no mass.      Tenderness: There is no abdominal tenderness.      Comments: No scarring at insulin injection sites     Musculoskeletal:      Cervical back: Neck supple.   Lymphadenopathy:      Cervical: No cervical adenopathy.   Skin:     General: Skin is warm and dry.      Findings: No erythema.   Neurological:      Mental Status: He is alert and oriented to " person, place, and time.      Cranial Nerves: No cranial nerve deficit.   Psychiatric:         Behavior: Behavior normal.       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 POCT A1C% was 8.3 today in the setting of anemia which causes a falsely lower A1C%. The estimated A1C% per his CGM was 9.2%. His glucose time in range () was only 20%, goal is > 70%.   CGM showed Hyperglycemia noted throughout the day, most days. There are a few days with good glycemic control on same doses of insulin. Not clear if days of significant hyperglycemia are due to missed insulin doses.    Had to D/C metformin ER in 04/2024 due to his low eGFR < 30  Due to his high insulin needs, and difficulty with MDI, will change basal-bolus regimen to Humulin U-500 kwikpens.  D/C Tresiba and Novolog    Start Humalog U-500 kwikpen: 70 units TID AC meals  Reviewed with patient Humulin R U-500 is highly concentrated and contains 5 times as much insulin in 1 mL as standard U-100 (100 units/mL) insulin. Reviewed safety precautions with patient and to only take the amount prescribed.  - Written instructions reviewed with patient    RTC 12/18/2024    Electronically Signed: Marylou Chau MD

## 2024-09-25 ENCOUNTER — TELEPHONE (OUTPATIENT)
Dept: ENDOCRINOLOGY | Facility: CLINIC | Age: 64
End: 2024-09-25
Payer: COMMERCIAL

## 2024-12-18 ENCOUNTER — PRIOR AUTHORIZATION (OUTPATIENT)
Dept: ENDOCRINOLOGY | Facility: CLINIC | Age: 64
End: 2024-12-18

## 2024-12-18 ENCOUNTER — OFFICE VISIT (OUTPATIENT)
Dept: ENDOCRINOLOGY | Facility: CLINIC | Age: 64
End: 2024-12-18
Payer: COMMERCIAL

## 2024-12-18 VITALS
HEIGHT: 65 IN | BODY MASS INDEX: 52.48 KG/M2 | HEART RATE: 77 BPM | DIASTOLIC BLOOD PRESSURE: 82 MMHG | WEIGHT: 315 LBS | OXYGEN SATURATION: 96 % | SYSTOLIC BLOOD PRESSURE: 130 MMHG

## 2024-12-18 DIAGNOSIS — Z79.4 TYPE 2 DIABETES MELLITUS WITH HYPERGLYCEMIA, WITH LONG-TERM CURRENT USE OF INSULIN: Primary | ICD-10-CM

## 2024-12-18 DIAGNOSIS — E11.65 TYPE 2 DIABETES MELLITUS WITH HYPERGLYCEMIA, WITH LONG-TERM CURRENT USE OF INSULIN: Primary | ICD-10-CM

## 2024-12-18 LAB
EXPIRATION DATE: ABNORMAL
EXPIRATION DATE: ABNORMAL
GLUCOSE BLDC GLUCOMTR-MCNC: 180 MG/DL (ref 70–130)
HBA1C MFR BLD: 8.6 % (ref 4.5–5.7)
Lab: ABNORMAL
Lab: ABNORMAL

## 2024-12-18 RX ORDER — INSULIN HUMAN 500 [IU]/ML
100 INJECTION, SOLUTION SUBCUTANEOUS
Qty: 18 ML | Refills: 5 | Status: SHIPPED | OUTPATIENT
Start: 2024-12-18

## 2024-12-18 RX ORDER — INSULIN HUMAN 500 [IU]/ML
100 INJECTION, SOLUTION SUBCUTANEOUS
Qty: 18 ML | Refills: 5 | Status: SHIPPED | OUTPATIENT
Start: 2024-12-18 | End: 2024-12-18 | Stop reason: SDUPTHER

## 2024-12-18 RX ORDER — ACYCLOVIR 400 MG/1
1 TABLET ORAL TAKE AS DIRECTED
Qty: 1 EACH | Refills: 0 | Status: SHIPPED | OUTPATIENT
Start: 2024-12-18

## 2024-12-18 RX ORDER — ACYCLOVIR 400 MG/1
1 TABLET ORAL
Qty: 9 EACH | Refills: 3 | Status: SHIPPED | OUTPATIENT
Start: 2024-12-18

## 2024-12-18 NOTE — TELEPHONE ENCOUNTER
ADALID LEONG (Key: [id number])  PA Case ID #: [id number]  Rx #: [id number]  Need Help? Call us at [phone]  Outcome  Approved today by [facility] 2017  Your PA request has been approved. Additional information will be provided in the approval communication. (Message 1144)  Authorization Expiration Date: 12/18/2025  Drug  Dexcom G7 Sensor  ePA cloud logo  Form  [employer] Electronic PA Form (2017 N[employer])  Original Claim Info  75    Phar notified

## 2024-12-18 NOTE — PROGRESS NOTES
Chief Complaint   Patient presents with    Diabetes     Follow-up       HPI:   Joseph Moore is a 63 y.o.male who returns to Endocrine Clinic for f/u evaluation of his Type 2 diabetes. Last clinic visit was on 08/14/2024. His history is as follows:    Interim Events:  - Has been taking the Humulin U-500 as directed.   - No new health issues.   - Last Nephrology Note states his Cr has improved to 1.9.     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  - Is followed by Nephrology, Dr. Raúl Durand, for his CKD   - Did not tolerate sample of Mounjaro 2.5 mg weekly given to him in 12/2023  - Did not tolerate Trulicity 0.75 mg weekly tried again in 04/2024. Her took 4 doses.   - Stopped Metformin  mg daily in 04/2024, due to change in his Cr.   - (08/2024) Was taking > 200 units of insulin per day in Tresiba and Novolog. Changed insulins to Humulin U-500    Current DM Medications:  - Humulin U-500: 70 units TID AC meals    Dexcom CGM Review:  SG > 250: 47% (goal < 5%)  SG (181 - 250): 30% (goal < 25%)  SG (70 - 180): 22% (Goal > 70%)   SG (54 - 69): 1% (goal < 4%)  SG < 54: 0% (goal < 1%)  Avg Glucose: 239  Glucose Variability: 31.4%  (goal </= 35%)  Glucose Management Indicator: 9.0%     Hyperglycemia noted throughout the day, most days. There are a few days with good glycemic control on same doses of insulin; however, pt reports eating less on those days.     DM Health Maintenance:  Ophtho: Last visit - (05/02/2023) moderate NPDR w/o ME, [facility]  Podiatry: Last visit - no recent visit  Monofilament / Foot exam: DUE  Lipids: (11/2023) TChol 143, , HDL 37, LDL 85 -  on lovastatin 40 mg daily   Urine Microalb/Cr ratio: (04/2024) <1.2  TSH: (04/2024) 2.570  CBC: (04/2024) Hgb 11.4  CMP:  "(04/2024) Cr 2.42, eGFR 29.7, LFTs WNL   Vit B12: (04/2024) 1,372    Review of Systems   Constitutional: Negative.         Wt stable   HENT: Negative.     Eyes: Negative.    Respiratory: Negative.     Cardiovascular: Negative.    Gastrointestinal: Negative.    Endocrine:        Hyperglycemia   Genitourinary: Negative.    Musculoskeletal: Negative.    Skin: Negative.    Allergic/Immunologic: Negative.    Neurological: Negative.    Hematological: Negative.    Psychiatric/Behavioral: Negative.       The following portions of the patient's history were reviewed and updated as appropriate: allergies, current medications, past family history, past medical history, past social history, past surgical history and problem list.      /82   Pulse 77   Ht 165.1 cm (65\")   Wt (!) 181 kg (398 lb)   SpO2 96%   BMI 66.23 kg/m²   Physical Exam  Vitals reviewed.   Constitutional:       General: He is not in acute distress.     Appearance: He is well-developed. He is obese. He is not diaphoretic.   HENT:      Head: Normocephalic.   Eyes:      Conjunctiva/sclera: Conjunctivae normal.      Pupils: Pupils are equal, round, and reactive to light.   Neck:      Thyroid: No thyromegaly.      Trachea: No tracheal deviation.      Comments: No palpable thyroid nodules    Cardiovascular:      Rate and Rhythm: Normal rate and regular rhythm.      Heart sounds: Normal heart sounds. No murmur heard.  Pulmonary:      Effort: Pulmonary effort is normal. No respiratory distress.      Breath sounds: Normal breath sounds.   Abdominal:      General: Bowel sounds are normal.      Palpations: Abdomen is soft. There is no mass.      Tenderness: There is no abdominal tenderness.      Comments: No scarring at insulin injection sites     Musculoskeletal:      Cervical back: Neck supple.   Lymphadenopathy:      Cervical: No cervical adenopathy.   Skin:     General: Skin is warm and dry.      Findings: No erythema.   Neurological:      Mental Status: " He is alert and oriented to person, place, and time.      Cranial Nerves: No cranial nerve deficit.   Psychiatric:         Behavior: Behavior normal.       LABS/IMAGING: prior and outside labs / imaging reviewed and summarized in HPI  Results for orders placed or performed in visit on 12/18/24   POC Glucose, Blood    Collection Time: 12/18/24  8:17 AM    Specimen: Blood   Result Value Ref Range    Glucose 180 (A) 70 - 130 mg/dL    Lot Number [id number]     Expiration Date 622,025    POC Glycosylated Hemoglobin (Hb A1C)    Collection Time: 12/18/24  8:18 AM    Specimen: Blood   Result Value Ref Range    Hemoglobin A1C 8.6 (A) 4.5 - 5.7 %    Lot Number [id number]     Expiration Date 812,026        ASSESSMENT/PLAN:    1) Type 2 diabetes with associated complications of CKD Stage 3, 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 POCT A1C% was 8.6 today in the setting of anemia which causes a falsely lower A1C%. The estimated A1C% per his CGM was 9.0%. His glucose time in range () was only 22%, goal is > 70%.   CGM showed Hyperglycemia noted throughout the day, most days. There are a few days with good glycemic control on same doses of insulin when he is consuming lower carb meals.    Increase Humulin U-500 kwikpen to: 90 units TID AC meals  Reviewed with patient Humulin R U-500 is highly concentrated and contains 5 times as much insulin in 1 mL as standard U-100 (100 units/mL) insulin. Reviewed safety precautions with patient and to only take the amount prescribed.  - Instructions reviewed with patient. Advised that if pre-meal BG is < 90, pt is to eat first and then take half dose (45 units).     RTC 04/21/2025    Electronically Signed: Marylou Chau MD

## 2025-03-21 ENCOUNTER — PRIOR AUTHORIZATION (OUTPATIENT)
Dept: ENDOCRINOLOGY | Facility: CLINIC | Age: 65
End: 2025-03-21
Payer: COMMERCIAL

## 2025-03-21 NOTE — TELEPHONE ENCOUNTER
PA started on CMM for Ozempic (1 MG/DOSE) 4MG/3ML pen-injectors    ADALID COLEMANUPIN (Key: [id number])  Ozempic (1 MG/DOSE) 4MG/3ML pen-injectors  Form  [facility] Electronic PA Form (2017 NCP[employer])  Created  7 days ago  Sent to Plan  1 minute ago  Plan Response  1 minute ago  Submit Clinical Questions  1 minute ago  Determination  Favorable  less than a minute ago  Message from Plan  Your PA request has been approved. Additional information will be provided in the approval communication. (Message 1146). Authorization Expiration Date: March 21, 2026.

## 2025-04-21 ENCOUNTER — TELEPHONE (OUTPATIENT)
Dept: ENDOCRINOLOGY | Facility: CLINIC | Age: 65
End: 2025-04-21

## 2025-04-21 ENCOUNTER — OFFICE VISIT (OUTPATIENT)
Dept: ENDOCRINOLOGY | Facility: CLINIC | Age: 65
End: 2025-04-21
Payer: COMMERCIAL

## 2025-04-21 VITALS
DIASTOLIC BLOOD PRESSURE: 71 MMHG | SYSTOLIC BLOOD PRESSURE: 133 MMHG | HEART RATE: 80 BPM | OXYGEN SATURATION: 96 % | BODY MASS INDEX: 52.48 KG/M2 | WEIGHT: 315 LBS | HEIGHT: 65 IN

## 2025-04-21 DIAGNOSIS — Z79.4 TYPE 2 DIABETES MELLITUS WITH STAGE 3B CHRONIC KIDNEY DISEASE, WITH LONG-TERM CURRENT USE OF INSULIN: Primary | ICD-10-CM

## 2025-04-21 DIAGNOSIS — E11.22 TYPE 2 DIABETES MELLITUS WITH STAGE 3B CHRONIC KIDNEY DISEASE, WITH LONG-TERM CURRENT USE OF INSULIN: Primary | ICD-10-CM

## 2025-04-21 DIAGNOSIS — N18.32 TYPE 2 DIABETES MELLITUS WITH STAGE 3B CHRONIC KIDNEY DISEASE, WITH LONG-TERM CURRENT USE OF INSULIN: Primary | ICD-10-CM

## 2025-04-21 LAB
ALBUMIN SERPL-MCNC: 3.2 G/DL (ref 3.5–5.2)
ALBUMIN/GLOB SERPL: 0.7 G/DL
ALP SERPL-CCNC: 81 U/L (ref 39–117)
ALT SERPL W P-5'-P-CCNC: 11 U/L (ref 1–41)
ANION GAP SERPL CALCULATED.3IONS-SCNC: 13.5 MMOL/L (ref 5–15)
AST SERPL-CCNC: 18 U/L (ref 1–40)
BILIRUB SERPL-MCNC: 0.5 MG/DL (ref 0–1.2)
BUN SERPL-MCNC: 30 MG/DL (ref 8–23)
BUN/CREAT SERPL: 14.3 (ref 7–25)
CALCIUM SPEC-SCNC: 9.2 MG/DL (ref 8.6–10.5)
CHLORIDE SERPL-SCNC: 103 MMOL/L (ref 98–107)
CHOLEST SERPL-MCNC: 123 MG/DL (ref 0–200)
CO2 SERPL-SCNC: 24.5 MMOL/L (ref 22–29)
CREAT SERPL-MCNC: 2.1 MG/DL (ref 0.76–1.27)
DEPRECATED RDW RBC AUTO: 50 FL (ref 37–54)
EGFRCR SERPLBLD CKD-EPI 2021: 34.9 ML/MIN/1.73
ERYTHROCYTE [DISTWIDTH] IN BLOOD BY AUTOMATED COUNT: 15.4 % (ref 12.3–15.4)
EXPIRATION DATE: ABNORMAL
EXPIRATION DATE: ABNORMAL
GLOBULIN UR ELPH-MCNC: 4.6 GM/DL
GLUCOSE BLDC GLUCOMTR-MCNC: 137 MG/DL (ref 70–130)
GLUCOSE SERPL-MCNC: 121 MG/DL (ref 65–99)
HBA1C MFR BLD: 7.4 % (ref 4.5–5.7)
HCT VFR BLD AUTO: 35.5 % (ref 37.5–51)
HDLC SERPL-MCNC: 38 MG/DL (ref 40–60)
HGB BLD-MCNC: 11.3 G/DL (ref 13–17.7)
LDLC SERPL CALC-MCNC: 71 MG/DL (ref 0–100)
LDLC/HDLC SERPL: 1.88 {RATIO}
Lab: ABNORMAL
Lab: ABNORMAL
MCH RBC QN AUTO: 28.3 PG (ref 26.6–33)
MCHC RBC AUTO-ENTMCNC: 31.8 G/DL (ref 31.5–35.7)
MCV RBC AUTO: 88.8 FL (ref 79–97)
PLATELET # BLD AUTO: 198 10*3/MM3 (ref 140–450)
PMV BLD AUTO: 11.3 FL (ref 6–12)
POTASSIUM SERPL-SCNC: 3.7 MMOL/L (ref 3.5–5.2)
PROT SERPL-MCNC: 7.8 G/DL (ref 6–8.5)
RBC # BLD AUTO: 4 10*6/MM3 (ref 4.14–5.8)
SODIUM SERPL-SCNC: 141 MMOL/L (ref 136–145)
TRIGL SERPL-MCNC: 67 MG/DL (ref 0–150)
TSH SERPL DL<=0.05 MIU/L-ACNC: 4.25 UIU/ML (ref 0.27–4.2)
VIT B12 BLD-MCNC: 961 PG/ML (ref 211–946)
VLDLC SERPL-MCNC: 14 MG/DL (ref 5–40)
WBC NRBC COR # BLD AUTO: 8.41 10*3/MM3 (ref 3.4–10.8)

## 2025-04-21 PROCEDURE — 80061 LIPID PANEL: CPT | Performed by: INTERNAL MEDICINE

## 2025-04-21 PROCEDURE — 99214 OFFICE O/P EST MOD 30 MIN: CPT | Performed by: INTERNAL MEDICINE

## 2025-04-21 PROCEDURE — 95251 CONT GLUC MNTR ANALYSIS I&R: CPT | Performed by: INTERNAL MEDICINE

## 2025-04-21 PROCEDURE — 80050 GENERAL HEALTH PANEL: CPT | Performed by: INTERNAL MEDICINE

## 2025-04-21 PROCEDURE — 82607 VITAMIN B-12: CPT | Performed by: INTERNAL MEDICINE

## 2025-04-21 PROCEDURE — 83036 HEMOGLOBIN GLYCOSYLATED A1C: CPT | Performed by: INTERNAL MEDICINE

## 2025-04-21 RX ORDER — FLUTICASONE PROPIONATE 50 UG/1
2 SPRAY, METERED NASAL DAILY
COMMUNITY
Start: 2025-04-18

## 2025-04-21 RX ORDER — DOXYCYCLINE 100 MG/1
100 CAPSULE ORAL 2 TIMES DAILY
COMMUNITY
Start: 2025-04-18

## 2025-04-21 RX ORDER — ONDANSETRON 4 MG/1
4 TABLET, ORALLY DISINTEGRATING ORAL EVERY 8 HOURS PRN
Qty: 18 TABLET | Refills: 5 | Status: SHIPPED | OUTPATIENT
Start: 2025-04-21

## 2025-04-21 RX ORDER — ECHINACEA PURPUREA EXTRACT 125 MG
1 TABLET ORAL
COMMUNITY
Start: 2025-04-18

## 2025-04-21 NOTE — TELEPHONE ENCOUNTER
----- Message from Marylou Chau sent at 4/21/2025  8:07 AM EDT -----  Regarding: outside labs  Please get copy of recent labs from [facility], [place]

## 2025-04-21 NOTE — PROGRESS NOTES
Chief Complaint   Patient presents with    Diabetes     Follow-up       HPI:   Joseph Moore is a 64 y.o.male who returns to [facility] for f/u evaluation of his Type 2 diabetes. Last clinic visit was on 12/18/2024. His history is as follows:    Interim Events:  - Has been taking the Humulin U-500 BID instead of TID. Has hypoglycemia when taking the lunch time dose.   - CKD is stable. Followed by nephrology.     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  - Is followed by Nephrology, Dr. Raúl Durand, for his CKD   - Did not tolerate sample of Mounjaro 2.5 mg weekly given to him in 12/2023  - Did not tolerate Trulicity 0.75 mg weekly tried again in 04/2024. Her took 4 doses.   - Stopped Metformin  mg daily in 04/2024, due to change in his Cr.   - (08/2024) Was taking > 200 units of insulin per day in Tresiba and Novolog. Changed insulins to Humulin U-500    Current DM Medications:  - Humulin U-500: 90 units TID AC meals.  Is taking 90 units twice daily due to hypoglycemia when taking the lunchtime dose    Dexcom CGM Review:  SG > 250: 43% (goal < 5%)  SG (181 - 250): 26% (goal < 25%)  SG (70 - 180): 24% (Goal > 70%)   SG (54 - 69): 3% (goal < 4%)  SG < 54: 4% (goal < 1%)  Avg Glucose: 220  Glucose Variability: 40.8%  (goal </= 35%)  Glucose Management Indicator: 8.6%     Hyperglycemia noted throughout the day, most days. There are a few days with good glycemic control on same doses of insulin; however, pt reports eating less on those days. 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         Wt gain   HENT: Negative.     Eyes: Negative.    Respiratory: Negative.     Cardiovascular: Negative.    Gastrointestinal: Negative.    Endocrine:        Hyperglycemia, hypoglycemia   Genitourinary: Negative.    Musculoskeletal: Negative.    Skin: Negative.    Allergic/Immunologic: Negative.    Neurological: Negative.    Hematological: Negative.    Psychiatric/Behavioral: Negative.       The following portions of the patient's history were reviewed and updated as appropriate: allergies, current medications, past family history, past medical history, past social history, past surgical history and problem list.    /71 (BP Location: Left arm, Patient Position: Sitting, Cuff Size: Adult)   Pulse 80   Ht 165.1 cm (65\")   Wt (!) 187 kg (411 lb 3.2 oz)   SpO2 96%   BMI 68.43 kg/m²   Physical Exam  Vitals reviewed.   Constitutional:       General: He is not in acute distress.     Appearance: He is well-developed. He is obese. He is not diaphoretic.   HENT:      Head: Normocephalic.   Eyes:      Conjunctiva/sclera: Conjunctivae normal.      Pupils: Pupils are equal, round, and reactive to light.   Neck:      Thyroid: No thyromegaly.      Trachea: No tracheal deviation.      Comments: No palpable thyroid nodules    Cardiovascular:      Rate and Rhythm: Normal rate and regular rhythm.      Heart sounds: Normal heart sounds. No murmur heard.  Pulmonary:      Effort: Pulmonary effort is normal. No respiratory distress.      Breath sounds: Normal breath sounds.   Abdominal:      General: Bowel sounds are normal.      Palpations: Abdomen is soft. There is no mass.      Tenderness: There is no abdominal tenderness.      Comments: No scarring at insulin " injection sites     Musculoskeletal:      Cervical back: Neck supple.   Lymphadenopathy:      Cervical: No cervical adenopathy.   Skin:     General: Skin is warm and dry.      Findings: No erythema.   Neurological:      Mental Status: He is alert and oriented to person, place, and time.      Cranial Nerves: No cranial nerve deficit.   Psychiatric:         Behavior: Behavior normal.       LABS/IMAGING: prior and outside labs / imaging reviewed and summarized in HPI  Results for orders placed or performed in visit on 04/21/25   POC Glucose, Blood    Collection Time: 04/21/25  7:54 AM    Specimen: Blood   Result Value Ref Range    Glucose 137 (A) 70 - 130 mg/dL    Lot Number [id number]     Expiration Date 09/13/2025    POC Glycosylated Hemoglobin (Hb A1C)    Collection Time: 04/21/25  7:55 AM    Specimen: Blood   Result Value Ref Range    Hemoglobin A1C 7.4 (A) 4.5 - 5.7 %    Lot Number [id number]     Expiration Date 01/02/2027    CBC (No Diff)    Collection Time: 04/21/25  8:21 AM    Specimen: Blood   Result Value Ref Range    WBC 8.41 3.40 - 10.80 10*3/mm3    RBC 4.00 (L) 4.14 - 5.80 10*6/mm3    Hemoglobin 11.3 (L) 13.0 - 17.7 g/dL    Hematocrit 35.5 (L) 37.5 - 51.0 %    MCV 88.8 79.0 - 97.0 fL    MCH 28.3 26.6 - 33.0 pg    MCHC 31.8 31.5 - 35.7 g/dL    RDW 15.4 12.3 - 15.4 %    RDW-SD 50.0 37.0 - 54.0 fl    MPV 11.3 6.0 - 12.0 fL    Platelets 198 140 - 450 10*3/mm3   Comprehensive Metabolic Panel    Collection Time: 04/21/25  8:21 AM    Specimen: Blood   Result Value Ref Range    Glucose 121 (H) 65 - 99 mg/dL    BUN 30 (H) 8 - 23 mg/dL    Creatinine 2.10 (H) 0.76 - 1.27 mg/dL    Sodium 141 136 - 145 mmol/L    Potassium 3.7 3.5 - 5.2 mmol/L    Chloride 103 98 - 107 mmol/L    CO2 24.5 22.0 - 29.0 mmol/L    Calcium 9.2 8.6 - 10.5 mg/dL    Total Protein 7.8 6.0 - 8.5 g/dL    Albumin 3.2 (L) 3.5 - 5.2 g/dL    ALT (SGPT) 11 1 - 41 U/L    AST (SGOT) 18 1 - 40 U/L    Alkaline Phosphatase 81 39 - 117 U/L    Total Bilirubin  0.5 0.0 - 1.2 mg/dL    Globulin 4.6 gm/dL    A/G Ratio 0.7 g/dL    BUN/Creatinine Ratio 14.3 7.0 - 25.0    Anion Gap 13.5 5.0 - 15.0 mmol/L    eGFR 34.9 (L) >60.0 mL/min/1.73   Lipid Panel    Collection Time: 04/21/25  8:21 AM    Specimen: Blood   Result Value Ref Range    Total Cholesterol 123 0 - 200 mg/dL    Triglycerides 67 0 - 150 mg/dL    HDL Cholesterol 38 (L) 40 - 60 mg/dL    LDL Cholesterol  71 0 - 100 mg/dL    VLDL Cholesterol 14 5 - 40 mg/dL    LDL/HDL Ratio 1.88    TSH    Collection Time: 04/21/25  8:21 AM    Specimen: Blood   Result Value Ref Range    TSH 4.250 (H) 0.270 - 4.200 uIU/mL   Vitamin B12    Collection Time: 04/21/25  8:21 AM    Specimen: Arm, Left; Blood   Result Value Ref Range    Vitamin B-12 961 (H) 211 - 946 pg/mL       ASSESSMENT/PLAN:    1) Type 2 diabetes with associated complications of CKD Stage 3, 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 POCT A1C% was 7.4 today in the setting of anemia which causes a falsely lower A1C%. The estimated A1C% per his CGM was 8.6%. His glucose time in range () was only 24%, goal is > 70%.   CGM showed Hyperglycemia noted throughout the day, most days. There are a few days with good glycemic control on same doses of insulin when he is consuming lower carb meals. He had frequent overnight hypoglycemia    Change Humulin U-500 kwikpen to: 80 units AC BK, 40 units AC LN, 80 units AC DN  Reviewed with patient Humulin R U-500 is highly concentrated and contains 5 times as much insulin in 1 mL as standard U-100 (100 units/mL) insulin. Reviewed safety precautions with patient and to only take the amount prescribed.  - Instructions reviewed with patient. Advised that if pre-meal BG is < 90, pt is to eat first and then take half dose (40 units).   - Pt agreeable to trying Mounjaro 2.5 mg pens again. Sample given to pt. Rx for Zofran also sent to his pharmacy to help with the nausea.     DM maintenance labs completed today and reviewed    RTC  08/18/2025    Electronically Signed: Marylou Chau MD

## 2025-05-19 RX ORDER — PEN NEEDLE, DIABETIC 32GX 5/32"
NEEDLE, DISPOSABLE MISCELLANEOUS
Qty: 400 EACH | Refills: 3 | Status: SHIPPED | OUTPATIENT
Start: 2025-05-19

## 2025-05-19 NOTE — TELEPHONE ENCOUNTER
Rx Refill Note  Requested Prescriptions     Signed Prescriptions Disp Refills    BD Pen Needle Mariam 2nd Gen 32G X 4 MM misc 400 each 3     Sig: USE FOUR TIMES DAILY AS DIRECTED FOR INSULIN INJECTIONS     Authorizing Provider: PATRIA ZAMORA     Ordering User: KATHLEEN YAN      Last office visit with prescribing clinician: 4/21/2025     Next office visit with prescribing clinician: 8/18/2025       Kathleen Yan  05/19/25, 08:39 EDT